# Patient Record
Sex: MALE | Race: BLACK OR AFRICAN AMERICAN | NOT HISPANIC OR LATINO | Employment: FULL TIME | URBAN - METROPOLITAN AREA
[De-identification: names, ages, dates, MRNs, and addresses within clinical notes are randomized per-mention and may not be internally consistent; named-entity substitution may affect disease eponyms.]

---

## 2021-06-10 ENCOUNTER — COMMUNICATION - HEALTHEAST (OUTPATIENT)
Dept: SCHEDULING | Facility: CLINIC | Age: 26
End: 2021-06-10

## 2021-06-25 NOTE — TELEPHONE ENCOUNTER
"Pt calls to report symptoms of withdrawal.  States \"I went to [facility] 6/8/21.\"  Reports diagnosis of \"upper resp infection.\"  States \"They gave me an inhaler.\"    Pt did not admit to \"drinking a bunch of alcohol with marijuana\" at time of ED eval.  \"They said my blood pressure was elevated and I should follow up at a clinic.\"    Had negative covid test 6/7/21.    Persisting symptoms now:  \"Feeling of confusion.\"  \"Sweating.\"  \"Diarrhea.\"  \"Vomiting.\"  \"Tremors.\"  \"Headache.\"  \"Weakness in my arms and legs.\"    \"Drinking a lot of water.\"    Pt's voice exhibits shakiness.  Advised pt to return to [facility] ED and be truthful about symptoms and substance abuse.  Discussed BP may still be elevated and pt needs medical supervision for his apparent withdrawal symptoms.  Pt agrees to plan.  Will have his brother drive him now.    Kristine Edwards RN  [facility] Triage     Reason for Disposition    Headache or vomiting    Additional Information    Negative: Difficult to awaken or acting confused (disoriented, slurred speech) and has diabetes    Negative: Difficult to awaken or acting confused (disoriented, slurred speech) and new onset    Negative: Weakness of the face, arm, or leg on one side of the body and new onset    Negative: Numbness of the face, arm, or leg on one side of the body and new onset    Negative: Loss of speech or garbled speech and new onset    Negative: Difficulty breathing and bluish (or gray) lips or face    Negative: Shock suspected (e.g., cold/pale/clammy skin, too weak to stand, low BP, rapid pulse)    Negative: Seeing or hearing or feeling things that are not there (i.e., auditory, visual, or tactile hallucinations)    Negative: Followed a head injury    Negative: Drug overdose suspected    Negative: Sounds like a life-threatening emergency to the triager    Negative: Alcohol use, abuse or dependence: question or problem related to    Negative: Drug abuse or dependence: question " "or problem related to    Protocols used: CONFUSION - DELIRIUM-A-OH    ______________________    COVID 19 Nurse Triage Plan/Patient Instructions    Please be aware that novel coronavirus (COVID-19) may be circulating in the community. If you develop symptoms such as fever, cough, or SOB or if you have concerns about the presence of another infection including coronavirus (COVID-19), please contact your health care provider or visit  [email or web address].    Disposition/Instructions    Additional COVID19 information to add for patients.   How can I protect others?  If you have symptoms (fever, cough, body aches or trouble breathing): Stay home and away from others (self-isolate) until:    At least 10 days have passed since your symptoms started, And     You ve had no fever--and no medicine that reduces fever--for 1 full day (24 hours), And      Your other symptoms have resolved (gotten better).     If you don t have symptoms, but a test showed that you have COVID-19 (you tested positive):    Stay home and away from others (self-isolate). Follow the tips under \"How do I self-isolate?\" below for 10 days (20 days if you have a weak immune system).    You don't need to be retested for COVID-19 before going back to school or work. As long as you're fever-free and feeling better, you can go back to school, work and other activities after waiting the 10 or 20 days.     How do I self-isolate?    Stay in your own room, even for meals. Use your own bathroom if you can.     Stay away from others in your home. No hugging, kissing or shaking hands. No visitors.    Don t go to work, school or anywhere else.     Clean  high touch  surfaces often (doorknobs, counters, handles, etc.). Use a household cleaning spray or wipes. You ll find a full list on the EPA website:  www.epa.gov/pesticide-registration/list-n-disinfectants-use-against-sars-cov-2.    Cover your mouth and nose with a mask, tissue or washcloth to avoid " spreading germs.    Wash your hands and face often. Use soap and water.    Caregivers in these groups are at risk for severe illness due to COVID-19:  o People 65 years and older  o People who live in a nursing home or long-term care facility  o People with chronic disease (lung, heart, cancer, diabetes, kidney, liver, immunologic)  o People who have a weakened immune system, including those who:  - Are in cancer treatment  - Take medicine that weakens the immune system, such as corticosteroids  - Had a bone marrow or organ transplant  - Have an immune deficiency  - Have poorly controlled HIV or AIDS  - Are obese (body mass index of 40 or higher)  - Smoke regularly    Caregivers should wear gloves while washing dishes, handling laundry and cleaning bedrooms and bathrooms.    Use caution when washing and drying laundry: Don t shake dirty laundry, and use the warmest water setting that you can.    For more tips, go to www.cdc.gov/coronavirus/2019-ncov/downloads/10Things.pdf.    How can I take care of myself?  1. Get lots of rest. Drink extra fluids (unless a doctor has told you not to).     2. Take Tylenol (acetaminophen) for fever or pain. If you have liver or kidney problems, ask your family doctor if it s okay to take Tylenol.     Adults can take either:     650 mg (two 325 mg pills) every 4 to 6 hours, or     1,000 mg (two 500 mg pills) every 8 hours as needed.     Note: Don t take more than 3,000 mg in one day.   Acetaminophen is found in many medicines (both prescribed and over-the-counter medicines). Read all labels to be sure you don t take too much.     For children, check the Tylenol bottle for the right dose. The dose is based on the child s age or weight.    3. If you have other health problems (like cancer, heart failure, an organ transplant or severe kidney disease): Call your specialty clinic if you don t feel better in the next 2 days.    4. Know when to call 911: Emergency warning signs  include:    Trouble breathing or shortness of breath    Pain or pressure in the chest that doesn t go away    Feeling confused like you haven t felt before, or not being able to wake up    Bluish-colored lips or face    What are the symptoms of COVID-19?     The most common symptoms are cough, fever and trouble breathing.     Less common symptoms include body aches, chills, diarrhea (loose, watery poops), fatigue (feeling very tired), headache, runny nose, sore throat and loss of smell.    COVID-19 can cause severe coughing (bronchitis) and lung infection (pneumonia).    How does it spread?     The virus may spread when a person coughs or sneezes into the air. The virus can travel about 6 feet this way, and it can live on surfaces.      Common  (household disinfectants) will kill the virus.    Who is at risk?  Anyone can catch COVID-19 if they re around someone who has the virus.    How can others protect themselves?     Stay away from people who have COVID-19 (or symptoms of COVID-19).    Wash hands often with soap and water. Or, use hand  with at least 60% alcohol.    Avoid touching the eyes, nose or mouth.     Wear a face mask when you go out in public, when sick or when caring for a sick person.    Where can I get more information?    [facility]: About COVID-19: [email or web address]    CDC: What to Do If You re Sick: www.cdc.gov/coronavirus/2019-ncov/about/steps-when-sick.html    CDC: Ending Home Isolation: www.cdc.gov/coronavirus/2019-ncov/hcp/disposition-in-home-patients.html     CDC: Caring for Someone: www.cdc.gov/coronavirus/2019-ncov/if-you-are-sick/care-for-someone.html     [facility]: Interim Guidance for Hospital Discharge to Home: www.health.[facility].mn.us/diseases/coronavirus/hcp/hospdischarge.pdf    [facility] clinical trials (COVID-19 research studies): clinicalaffairs.[facility]/n-clinical-trials     Below are the COVID-19 hotlines at the [facility]  [facility] ([facility]). Interpreters are available.   o For health questions: Call 646-319-4898 or 1-659.771.7616 (7 a.m. to 7 p.m.)  o For questions about schools and childcare: Call 545-053-7018 or 1-765.339.6499 (7 a.m. to 7 p.m.)              Thank you for taking steps to prevent the spread of this virus.  o Limit your contact with others.  o Wear a simple mask to cover your cough.  o Wash your hands well and often.    Resources    [facility]: About COVID-19: [email or web address]    CDC: What to Do If You're Sick: www.cdc.gov/coronavirus/2019-ncov/about/steps-when-sick.html    CDC: Ending Home Isolation: www.cdc.gov/coronavirus/2019-ncov/hcp/disposition-in-home-patients.html     CDC: Caring for Someone: www.cdc.gov/coronavirus/2019-ncov/if-you-are-sick/care-for-someone.html     [facility]: Interim Guidance for Hospital Discharge to Home: www.health.[facility].mn./diseases/coronavirus/hcp/hospdischarge.pdf    [facility] clinical trials (COVID-19 research studies): clinicalaffairs.[facility]/n-clinical-trials     Below are the COVID-19 hotlines at the [place] [facility] ([facility]). Interpreters are available.   o For health questions: Call 738-928-0148 or 1-355.203.9849 (7 a.m. to 7 p.m.)  o For questions about schools and childcare: Call 018-180-3831 or 1-793.662.9303 (7 a.m. to 7 p.m.)

## 2021-06-29 ENCOUNTER — COMMUNICATION - HEALTHEAST (OUTPATIENT)
Dept: SCHEDULING | Facility: CLINIC | Age: 26
End: 2021-06-29

## 2021-07-07 NOTE — TELEPHONE ENCOUNTER
"Triage note    Caller name: Hamilton  Relation to patient: self    Patient calling to report that he has chest pain that radiates down into his left arm that started yesterday and is constant. He says his BP last night was \"like 150 over 100-something.\"  Rates the pain in his chest as 7/10, sharp pain and constant. Denies any underlying health conditions/heart concerns.    Disposition: per protocol, patient to go to ED now. Patient states he's unable to get a ride right now. Discussed that he could call 911 for evaluation and possible transportation to the hospital via ambulance. He verbalized his understanding and agrees.      Ber Rosa RN  [facility] Nurse Advisor  Reason for Disposition    Pain also in shoulder(s) or arm(s) or jaw    Additional Information    Negative: Severe difficulty breathing (e.g., struggling for each breath, speaks in single words)    Negative: Difficult to awaken or acting confused (e.g., disoriented, slurred speech)    Negative: Shock suspected (e.g., cold/pale/clammy skin, too weak to stand, low BP, rapid pulse)    Negative: Passed out (i.e., fainted, collapsed and was not responding)    Negative: Chest pain lasting longer than 5 minutes and ANY of the following:* Over 45 years old* Over 30 years old and at least one cardiac risk factor (e.g., diabetes, high blood pressure, high cholesterol, smoker, or strong family history of heart disease)* History of heart disease (i.e., angina, heart attack, heart failure, bypass surgery, takes nitroglycerin)* Pain is crushing, pressure-like, or heavy    Negative: Heart beating < 50 beats per minute OR > 140 beats per minute    Negative: Visible sweat on face or sweat dripping down face    Negative: Sounds like a life-threatening emergency to the triager    Negative: Followed an injury to chest    Negative: SEVERE chest pain    Protocols used: CHEST PAIN-A-OH      "

## 2021-07-08 ENCOUNTER — ALLIED HEALTH/NURSE VISIT (OUTPATIENT)
Dept: FAMILY MEDICINE | Facility: CLINIC | Age: 26
End: 2021-07-08

## 2021-07-08 ENCOUNTER — APPOINTMENT (OUTPATIENT)
Dept: CT IMAGING | Facility: CLINIC | Age: 26
DRG: 071 | End: 2021-07-08
Attending: EMERGENCY MEDICINE
Payer: COMMERCIAL

## 2021-07-08 ENCOUNTER — HOSPITAL ENCOUNTER (INPATIENT)
Facility: CLINIC | Age: 26
LOS: 5 days | Discharge: HOME | DRG: 071 | End: 2021-07-13
Attending: EMERGENCY MEDICINE | Admitting: INTERNAL MEDICINE
Payer: COMMERCIAL

## 2021-07-08 ENCOUNTER — APPOINTMENT (OUTPATIENT)
Dept: GENERAL RADIOLOGY | Facility: CLINIC | Age: 26
DRG: 071 | End: 2021-07-08
Attending: EMERGENCY MEDICINE
Payer: COMMERCIAL

## 2021-07-08 VITALS
DIASTOLIC BLOOD PRESSURE: 94 MMHG | SYSTOLIC BLOOD PRESSURE: 136 MMHG | HEART RATE: 135 BPM | OXYGEN SATURATION: 100 % | RESPIRATION RATE: 20 BRPM

## 2021-07-08 DIAGNOSIS — M62.81 MUSCLE WEAKNESS (GENERALIZED): ICD-10-CM

## 2021-07-08 DIAGNOSIS — F29 UNSPECIFIED PSYCHOSIS NOT DUE TO A SUBSTANCE OR KNOWN PHYSIOLOGICAL CONDITION (H): Primary | ICD-10-CM

## 2021-07-08 DIAGNOSIS — R41.82 ALTERED MENTAL STATUS, UNSPECIFIED ALTERED MENTAL STATUS TYPE: ICD-10-CM

## 2021-07-08 DIAGNOSIS — R41.89 UNRESPONSIVE: Primary | ICD-10-CM

## 2021-07-08 LAB
ALBUMIN SERPL-MCNC: 4.5 G/DL (ref 3.4–5)
ALP SERPL-CCNC: 78 U/L (ref 40–150)
ALT SERPL W P-5'-P-CCNC: 44 U/L (ref 0–70)
AMMONIA PLAS-SCNC: 15 UMOL/L (ref 10–50)
ANION GAP SERPL CALCULATED.3IONS-SCNC: 10 MMOL/L (ref 3–14)
AST SERPL W P-5'-P-CCNC: 20 U/L (ref 0–45)
BASOPHILS # BLD AUTO: 0.1 10E9/L (ref 0–0.2)
BASOPHILS NFR BLD AUTO: 0.4 %
BILIRUB SERPL-MCNC: 1.9 MG/DL (ref 0.2–1.3)
BUN SERPL-MCNC: 18 MG/DL (ref 7–30)
CALCIUM SERPL-MCNC: 9.9 MG/DL (ref 8.5–10.1)
CHLORIDE SERPL-SCNC: 104 MMOL/L (ref 94–109)
CO2 SERPL-SCNC: 26 MMOL/L (ref 20–32)
CREAT SERPL-MCNC: 0.96 MG/DL (ref 0.66–1.25)
DIFFERENTIAL METHOD BLD: ABNORMAL
EOSINOPHIL # BLD AUTO: 0.1 10E9/L (ref 0–0.7)
EOSINOPHIL NFR BLD AUTO: 0.9 %
ERYTHROCYTE [DISTWIDTH] IN BLOOD BY AUTOMATED COUNT: 12.8 % (ref 10–15)
ETHANOL SERPL-MCNC: <0.01 G/DL
GFR SERPL CREATININE-BSD FRML MDRD: >90 ML/MIN/{1.73_M2}
GLUCOSE SERPL-MCNC: 104 MG/DL (ref 70–99)
HCT VFR BLD AUTO: 46.4 % (ref 40–53)
HGB BLD-MCNC: 15 G/DL (ref 13.3–17.7)
IMM GRANULOCYTES # BLD: 0 10E9/L (ref 0–0.4)
IMM GRANULOCYTES NFR BLD: 0.2 %
LIPASE SERPL-CCNC: 59 U/L (ref 73–393)
LYMPHOCYTES # BLD AUTO: 2.5 10E9/L (ref 0.8–5.3)
LYMPHOCYTES NFR BLD AUTO: 20.6 %
MCH RBC QN AUTO: 30.2 PG (ref 26.5–33)
MCHC RBC AUTO-ENTMCNC: 32.3 G/DL (ref 31.5–36.5)
MCV RBC AUTO: 93 FL (ref 78–100)
MONOCYTES # BLD AUTO: 1.2 10E9/L (ref 0–1.3)
MONOCYTES NFR BLD AUTO: 9.7 %
NEUTROPHILS # BLD AUTO: 8.4 10E9/L (ref 1.6–8.3)
NEUTROPHILS NFR BLD AUTO: 68.2 %
NRBC # BLD AUTO: 0 10*3/UL
NRBC BLD AUTO-RTO: 0 /100
PLATELET # BLD AUTO: 395 10E9/L (ref 150–450)
POTASSIUM SERPL-SCNC: 3.3 MMOL/L (ref 3.4–5.3)
PROT SERPL-MCNC: 9 G/DL (ref 6.8–8.8)
RBC # BLD AUTO: 4.97 10E12/L (ref 4.4–5.9)
SODIUM SERPL-SCNC: 140 MMOL/L (ref 133–144)
TSH SERPL DL<=0.005 MIU/L-ACNC: 2.04 MU/L (ref 0.4–4)
WBC # BLD AUTO: 12.3 10E9/L (ref 4–11)

## 2021-07-08 PROCEDURE — 93005 ELECTROCARDIOGRAM TRACING: CPT

## 2021-07-08 PROCEDURE — 82140 ASSAY OF AMMONIA: CPT | Performed by: EMERGENCY MEDICINE

## 2021-07-08 PROCEDURE — 250N000011 HC RX IP 250 OP 636: Performed by: EMERGENCY MEDICINE

## 2021-07-08 PROCEDURE — 71046 X-RAY EXAM CHEST 2 VIEWS: CPT

## 2021-07-08 PROCEDURE — 120N000001 HC R&B MED SURG/OB

## 2021-07-08 PROCEDURE — 83690 ASSAY OF LIPASE: CPT | Performed by: EMERGENCY MEDICINE

## 2021-07-08 PROCEDURE — 82077 ASSAY SPEC XCP UR&BREATH IA: CPT | Performed by: EMERGENCY MEDICINE

## 2021-07-08 PROCEDURE — 85025 COMPLETE CBC W/AUTO DIFF WBC: CPT | Performed by: EMERGENCY MEDICINE

## 2021-07-08 PROCEDURE — 74177 CT ABD & PELVIS W/CONTRAST: CPT

## 2021-07-08 PROCEDURE — 80307 DRUG TEST PRSMV CHEM ANLYZR: CPT | Performed by: EMERGENCY MEDICINE

## 2021-07-08 PROCEDURE — 86618 LYME DISEASE ANTIBODY: CPT | Performed by: EMERGENCY MEDICINE

## 2021-07-08 PROCEDURE — 99223 1ST HOSP IP/OBS HIGH 75: CPT | Mod: AI | Performed by: INTERNAL MEDICINE

## 2021-07-08 PROCEDURE — C9803 HOPD COVID-19 SPEC COLLECT: HCPCS

## 2021-07-08 PROCEDURE — 70450 CT HEAD/BRAIN W/O DYE: CPT

## 2021-07-08 PROCEDURE — 81001 URINALYSIS AUTO W/SCOPE: CPT | Performed by: EMERGENCY MEDICINE

## 2021-07-08 PROCEDURE — 84443 ASSAY THYROID STIM HORMONE: CPT | Performed by: EMERGENCY MEDICINE

## 2021-07-08 PROCEDURE — 96360 HYDRATION IV INFUSION INIT: CPT

## 2021-07-08 PROCEDURE — 36415 COLL VENOUS BLD VENIPUNCTURE: CPT | Performed by: EMERGENCY MEDICINE

## 2021-07-08 PROCEDURE — 99207 PR NO CHARGE NURSE ONLY: CPT

## 2021-07-08 PROCEDURE — 96361 HYDRATE IV INFUSION ADD-ON: CPT

## 2021-07-08 PROCEDURE — 99285 EMERGENCY DEPT VISIT HI MDM: CPT | Mod: 25

## 2021-07-08 PROCEDURE — 258N000003 HC RX IP 258 OP 636: Performed by: EMERGENCY MEDICINE

## 2021-07-08 PROCEDURE — 80053 COMPREHEN METABOLIC PANEL: CPT | Performed by: EMERGENCY MEDICINE

## 2021-07-08 RX ORDER — IOPAMIDOL 755 MG/ML
500 INJECTION, SOLUTION INTRAVASCULAR ONCE
Status: COMPLETED | OUTPATIENT
Start: 2021-07-08 | End: 2021-07-08

## 2021-07-08 RX ORDER — SODIUM CHLORIDE 9 MG/ML
INJECTION, SOLUTION INTRAVENOUS CONTINUOUS
Status: DISCONTINUED | OUTPATIENT
Start: 2021-07-08 | End: 2021-07-12

## 2021-07-08 RX ADMIN — SODIUM CHLORIDE 1000 ML: 9 INJECTION, SOLUTION INTRAVENOUS at 21:30

## 2021-07-08 RX ADMIN — IOPAMIDOL 100 ML: 755 INJECTION, SOLUTION INTRAVENOUS at 18:26

## 2021-07-08 RX ADMIN — SODIUM CHLORIDE 1000 ML: 9 INJECTION, SOLUTION INTRAVENOUS at 18:07

## 2021-07-08 ASSESSMENT — ENCOUNTER SYMPTOMS
WEAKNESS: 1
BACK PAIN: 1
APPETITE CHANGE: 1
EYE PAIN: 1
ABDOMINAL PAIN: 1
ACTIVITY CHANGE: 1
HEADACHES: 1

## 2021-07-08 NOTE — ED TRIAGE NOTES
Patient is here for generalized weakness, not talking, constipation, and head pain, fatigue and difficulty sleeping.    He was first seen at [facility] on June 29 for severe back pain and cramping.  Since then he has been constipated, not sleeping well, having abdominal pain. He has been having difficulty walking for 3 days, and trouble talking since he was in the ER on the 29th.      His brother is talking for the patient.  Patient nods and shakes his head yes/no.

## 2021-07-08 NOTE — LETTER
[facility]  [address] [place] [address]  [phone] July 16, 2021    RE:  Hamilton Mitchell                                                                                                                                                       [address] [place] [address]            To whom it may concern:    Hamilton Mitchell is under my professional care and admitted to the hospital for medical reasons from 7/8-7/13.       Sincerely,  Hilda Kilpatrick PA-C

## 2021-07-08 NOTE — PROGRESS NOTES
"Pt came in late for his appt with VINICIO MICHAEL.  Pt's brother with pt.  S-(situation): \"brain fog\" unable to walk, talk do daily ADLs.    B-(background): brother states pt had alcohol poisoning two weeks ago.  At that time he recovered with in two days.  He then developed GI issues of constipation and pain.  Pt was seen at [facility] ER and then referred to [facility].  Unable to see records in epic.  At that time pt was A/O with no cognitive impairment.  [place] and [place] no physical or cognitive impairments normally.  For the last two days pt has been declining, unable to walk w/o falling unable to talk, eat, do norm ADLs.  Pt has been using a wc from his disabled parent.      A-(assessment): /94  RR 20 SaO2 100%.  Pt sitting in WC.  Pt will not talk.  Pt follows RN with eyes.  Pt is unable to smile, open mouth or talk, but noted seems to want to talk, pt drooling.  Hand strength weak unable to raise arms.  Pt unable to move legs will push down on feet unable to push back with feet or raise legs.  Did not assess ability to stand or walk.      R-(recommendations): ER for cognitive workup.     Shameka BOOGIE RN, BSN, PAL (Patient Advocate Liaison)  [facility]   [phone]    "

## 2021-07-08 NOTE — ED PROVIDER NOTES
History     Chief Complaint:  Myriad of Issues       The history is provided by a relative and a parent.      Hamilton Mitchell is a 25 year old male who presents with myriad of issues.  He returned to the ED last week for bad cramps, but was sent home. Since then, he has had a hard time talking or walking which has been progressively getting worse. He currently has constipation and abdominal pain on the left side. He has been unable to make a full bowel movement for about a week now. He did have a small one last night but it was a episode of diarrhea. There was some blood his stool a while back. He is also having a hard time vomiting. He has not been able to eating or drinking well. He complains of his stomach being distended. He is also having a hard time sleeping and only gets a hour of sleep at night. He will usually wake up with anxiety. He appears to be confused at some moments and has total body weakness. He currently has back pain, ear pain, and a headache. The patient has been seen at Urgent care, [facility], and [facility] in the past month since his symptoms started.       Review of Systems   Constitutional: Positive for activity change and appetite change.   Eyes: Positive for pain.   Gastrointestinal: Positive for abdominal pain.   Musculoskeletal: Positive for back pain.   Neurological: Positive for weakness and headaches.     All other reviewed neg.  Difficult to assess possible mental health.    Allergies:  No Known Allergies    Medications:    Patient did not report medications.     Past Medical History:    [place]   [place]  Obesity  Asthma   Mixed HLD    Past Surgical History:    Eye Surgery     Family History:    Patient did not report family history.     Social History:  Patient was accompanied to the ED with brother and father.    Physical Exam     Patient Vitals for the past 24 hrs:   BP Temp Temp src Pulse Resp SpO2   07/08/21 2100 (!) 158/106 -- -- 117 -- --   07/08/21 2045 (!) 151/109 -- -- 121  -- 98 %   21 (!) 158/100 -- -- 122 -- 99 %   21 (!) 159/98 -- -- 117 -- --   21 (!) 147/97 -- -- 120 -- 99 %   21 (!) 156/94 -- -- 119 -- 99 %   21 1930 (!) 155/93 -- -- 113 -- 98 %   21 1915 (!) 154/95 -- -- 118 -- 98 %   21 1900 (!) 154/97 -- -- 120 -- 99 %   21 1815 (!) 140/102 -- -- 113 23 100 %   21 1800 (!) 162/100 -- -- 134 (!) 36 100 %   21 1745 (!) 154/100 -- -- 134 30 100 %   21 1700 (!) 171/110 -- -- -- -- --   21 1653 (!) 147/95 98  F (36.7  C) Temporal 139 16 100 %       Physical Exam  Constitutional: No distress.  Head: Atraumatic.  Mouth/Throat: Oropharynx is clear and moist. No oropharyngeal exudate.  Eyes: Conjunctivae and EOM are normal. No scleral icterus.  Neck: Normal range of motion. Neck supple.  No thyromegaly.  Cardiovascular: Normal rate, regular rhythm, normal heart sounds and intact distal pulses.   Pulmonary/Chest: Breath sounds normal. No respiratory distress.  Abdominal: Soft. Bowel sounds are normal. No distension. No tenderness. No rebound or guarding.   Musculoskeletal: Normal range of motion. No edema or tenderness.   Neurological: Alert and orientated to person. Eye open, flat affect. Abnormal Stare. Slow Speech.   Skin: Warm and dry. No rash noted. Not diaphoretic.     Emergency Department Course   EC  ECG taken at 1735, ECG read at 735  Sinus Tachycardia   Moderate Voltage criteria for LVH, may be normal variant  Borderline ECG   Rate 125 bpm. MS interval 122 ms. QRS duration 84 ms. QT/QTc 332/479 ms. P-R-T axes 37 -2 18.     Imaging:  XR Chest 2 Views  Final Result  IMPRESSION: Low lung volumes. Heart size and pulmonary vascularity normal. The lungs are clear.  Per Radiology     CT Abdomen Pelvis w Contrast  Final Result  IMPRESSION:   1.  There is fluid seen throughout the colon raising question of diarrhea but there is no inflammatory changes of bowel wall.  2.  Mild fatty  infiltration of liver.  Per Radiology     CT Head w/o Contrast  Final Result  IMPRESSION:  1.  No CT finding of a mass, hemorrhage or focal area suggestive of acute infarct.  Per Radiology     Laboratory:  CBC: WBC 12.3 (H), HGB 15,    Lipase: 59 (L)    Emergency Department Course:    Reviewed:  I reviewed nursing notes, vitals, past history and care everywhere    Assessments:  1716 I obtained history and examined the patient as noted above.    I rechecked the patient and explained findings.     Consults:   Hospitalist    Interventions:  1807 NA 1L IV    Disposition:  The patient was admitted to the hospital under the care of Dr. Quan.    Impression & Plan    Medical Decision Making:  Pt has a myriad of sx that do not add up.  He appears encephalopathic and possibly mental health such as schizophrenia.  We expanded his workup quite a bit tonight without any new unifying diagnosis but pt is not mentally or neurologically well at this point, has stopped walking in the last 2 days and needs admission for further workup and neuro, mental health, and GI evaluation.      Covid-19  Hamilton Mitchell was evaluated during a global COVID-19 pandemic, which necessitated consideration that the patient might be at risk for infection with the SARS-CoV-2 virus that causes COVID-19.   Applicable protocols for evaluation were followed during the patient's care.   COVID-19 was considered as part of the patient's evaluation. The plan for testing is:  a test was obtained during this visit.    Diagnosis:    ICD-10-CM    1. Muscle weakness (generalized)  M62.81    2. Altered mental status, unspecified altered mental status type  R41.82        Discharge Medications:  New Prescriptions    No medications on file         Scribe Disclosure:  I, Magen Fuller, am serving as a scribe at 5:00 PM on 7/8/2021 to document services personally performed by Damian Beltran MD based on my observations and the provider's statements to me.       Damian Beltran MD  07/08/21 8751

## 2021-07-09 ENCOUNTER — APPOINTMENT (OUTPATIENT)
Dept: MRI IMAGING | Facility: CLINIC | Age: 26
DRG: 071 | End: 2021-07-09
Attending: INTERNAL MEDICINE
Payer: COMMERCIAL

## 2021-07-09 LAB
ALBUMIN UR-MCNC: 30 MG/DL
AMPHETAMINES UR QL SCN: NEGATIVE
ANION GAP SERPL CALCULATED.3IONS-SCNC: 9 MMOL/L (ref 3–14)
APPEARANCE UR: CLEAR
B BURGDOR IGG+IGM SER QL: 0.48 (ref 0–0.89)
BARBITURATES UR QL: NEGATIVE
BENZODIAZ UR QL: NEGATIVE
BILIRUB UR QL STRIP: NEGATIVE
BUN SERPL-MCNC: 10 MG/DL (ref 7–30)
CALCIUM SERPL-MCNC: 9.3 MG/DL (ref 8.5–10.1)
CANNABINOIDS UR QL SCN: NEGATIVE
CHLORIDE SERPL-SCNC: 110 MMOL/L (ref 94–109)
CO2 SERPL-SCNC: 24 MMOL/L (ref 20–32)
COCAINE UR QL: NEGATIVE
COLOR UR AUTO: YELLOW
CREAT SERPL-MCNC: 0.68 MG/DL (ref 0.66–1.25)
CRP SERPL-MCNC: 16 MG/L (ref 0–8)
ERYTHROCYTE [DISTWIDTH] IN BLOOD BY AUTOMATED COUNT: 12.7 % (ref 10–15)
ERYTHROCYTE [SEDIMENTATION RATE] IN BLOOD BY WESTERGREN METHOD: 9 MM/H (ref 0–15)
GFR SERPL CREATININE-BSD FRML MDRD: >90 ML/MIN/{1.73_M2}
GLUCOSE BLDC GLUCOMTR-MCNC: 79 MG/DL (ref 70–99)
GLUCOSE BLDC GLUCOMTR-MCNC: 90 MG/DL (ref 70–99)
GLUCOSE BLDC GLUCOMTR-MCNC: 90 MG/DL (ref 70–99)
GLUCOSE BLDC GLUCOMTR-MCNC: 91 MG/DL (ref 70–99)
GLUCOSE SERPL-MCNC: 92 MG/DL (ref 70–99)
GLUCOSE UR STRIP-MCNC: NEGATIVE MG/DL
HCT VFR BLD AUTO: 42.4 % (ref 40–53)
HGB BLD-MCNC: 13.8 G/DL (ref 13.3–17.7)
HGB UR QL STRIP: NEGATIVE
INTERPRETATION ECG - MUSE: NORMAL
KETONES UR STRIP-MCNC: 60 MG/DL
LABORATORY COMMENT REPORT: NORMAL
LEUKOCYTE ESTERASE UR QL STRIP: NEGATIVE
MCH RBC QN AUTO: 30.4 PG (ref 26.5–33)
MCHC RBC AUTO-ENTMCNC: 32.5 G/DL (ref 31.5–36.5)
MCV RBC AUTO: 93 FL (ref 78–100)
MUCOUS THREADS #/AREA URNS LPF: PRESENT /LPF
NITRATE UR QL: NEGATIVE
OPIATES UR QL SCN: NEGATIVE
PCP UR QL SCN: NEGATIVE
PH UR STRIP: 5.5 PH (ref 5–7)
PLATELET # BLD AUTO: 334 10E9/L (ref 150–450)
POTASSIUM SERPL-SCNC: 3.5 MMOL/L (ref 3.4–5.3)
RBC # BLD AUTO: 4.54 10E12/L (ref 4.4–5.9)
RBC #/AREA URNS AUTO: 1 /HPF (ref 0–2)
SARS-COV-2 RNA RESP QL NAA+PROBE: NEGATIVE
SODIUM SERPL-SCNC: 143 MMOL/L (ref 133–144)
SOURCE: ABNORMAL
SP GR UR STRIP: 1.03 (ref 1–1.03)
SPECIMEN SOURCE: NORMAL
SQUAMOUS #/AREA URNS AUTO: <1 /HPF (ref 0–1)
UROBILINOGEN UR STRIP-MCNC: NORMAL MG/DL (ref 0–2)
VIT B12 SERPL-MCNC: 267 PG/ML (ref 193–986)
WBC # BLD AUTO: 11.1 10E9/L (ref 4–11)
WBC #/AREA URNS AUTO: 2 /HPF (ref 0–5)

## 2021-07-09 PROCEDURE — 80048 BASIC METABOLIC PNL TOTAL CA: CPT | Performed by: INTERNAL MEDICINE

## 2021-07-09 PROCEDURE — 999N001017 HC STATISTIC GLUCOSE BY METER IP

## 2021-07-09 PROCEDURE — 85027 COMPLETE CBC AUTOMATED: CPT | Performed by: INTERNAL MEDICINE

## 2021-07-09 PROCEDURE — 70553 MRI BRAIN STEM W/O & W/DYE: CPT

## 2021-07-09 PROCEDURE — 36415 COLL VENOUS BLD VENIPUNCTURE: CPT | Performed by: PSYCHIATRY & NEUROLOGY

## 2021-07-09 PROCEDURE — 258N000003 HC RX IP 258 OP 636: Performed by: INTERNAL MEDICINE

## 2021-07-09 PROCEDURE — 258N000001 HC RX 258: Performed by: INTERNAL MEDICINE

## 2021-07-09 PROCEDURE — 258N000003 HC RX IP 258 OP 636: Performed by: EMERGENCY MEDICINE

## 2021-07-09 PROCEDURE — 86140 C-REACTIVE PROTEIN: CPT | Performed by: INTERNAL MEDICINE

## 2021-07-09 PROCEDURE — 82607 VITAMIN B-12: CPT | Performed by: INTERNAL MEDICINE

## 2021-07-09 PROCEDURE — A9585 GADOBUTROL INJECTION: HCPCS | Performed by: INTERNAL MEDICINE

## 2021-07-09 PROCEDURE — 85652 RBC SED RATE AUTOMATED: CPT | Performed by: INTERNAL MEDICINE

## 2021-07-09 PROCEDURE — 36415 COLL VENOUS BLD VENIPUNCTURE: CPT | Performed by: INTERNAL MEDICINE

## 2021-07-09 PROCEDURE — 87635 SARS-COV-2 COVID-19 AMP PRB: CPT | Performed by: EMERGENCY MEDICINE

## 2021-07-09 PROCEDURE — 120N000001 HC R&B MED SURG/OB

## 2021-07-09 PROCEDURE — 86255 FLUORESCENT ANTIBODY SCREEN: CPT | Performed by: PSYCHIATRY & NEUROLOGY

## 2021-07-09 PROCEDURE — 255N000002 HC RX 255 OP 636: Performed by: INTERNAL MEDICINE

## 2021-07-09 PROCEDURE — 99233 SBSQ HOSP IP/OBS HIGH 50: CPT | Performed by: INTERNAL MEDICINE

## 2021-07-09 RX ORDER — POLYETHYLENE GLYCOL 3350 17 G/17G
1 POWDER, FOR SOLUTION ORAL DAILY
COMMUNITY
End: 2021-07-22

## 2021-07-09 RX ORDER — ACETAMINOPHEN 325 MG/1
650 TABLET ORAL EVERY 4 HOURS PRN
Status: DISCONTINUED | OUTPATIENT
Start: 2021-07-09 | End: 2021-07-13 | Stop reason: HOSPADM

## 2021-07-09 RX ORDER — NICOTINE POLACRILEX 4 MG
15-30 LOZENGE BUCCAL
Status: DISCONTINUED | OUTPATIENT
Start: 2021-07-09 | End: 2021-07-13 | Stop reason: HOSPADM

## 2021-07-09 RX ORDER — DEXTROSE MONOHYDRATE, SODIUM CHLORIDE, AND POTASSIUM CHLORIDE 50; 1.49; 9 G/1000ML; G/1000ML; G/1000ML
INJECTION, SOLUTION INTRAVENOUS CONTINUOUS
Status: DISCONTINUED | OUTPATIENT
Start: 2021-07-09 | End: 2021-07-12

## 2021-07-09 RX ORDER — DEXTROSE MONOHYDRATE 25 G/50ML
25-50 INJECTION, SOLUTION INTRAVENOUS
Status: DISCONTINUED | OUTPATIENT
Start: 2021-07-09 | End: 2021-07-13 | Stop reason: HOSPADM

## 2021-07-09 RX ORDER — AMOXICILLIN 250 MG
2 CAPSULE ORAL 2 TIMES DAILY
Status: DISCONTINUED | OUTPATIENT
Start: 2021-07-09 | End: 2021-07-13 | Stop reason: HOSPADM

## 2021-07-09 RX ORDER — LIDOCAINE 40 MG/G
CREAM TOPICAL
Status: DISCONTINUED | OUTPATIENT
Start: 2021-07-09 | End: 2021-07-13 | Stop reason: HOSPADM

## 2021-07-09 RX ORDER — ONDANSETRON 2 MG/ML
4 INJECTION INTRAMUSCULAR; INTRAVENOUS EVERY 6 HOURS PRN
Status: DISCONTINUED | OUTPATIENT
Start: 2021-07-09 | End: 2021-07-13 | Stop reason: HOSPADM

## 2021-07-09 RX ORDER — AMOXICILLIN 250 MG
1 CAPSULE ORAL 2 TIMES DAILY
Status: DISCONTINUED | OUTPATIENT
Start: 2021-07-09 | End: 2021-07-13 | Stop reason: HOSPADM

## 2021-07-09 RX ORDER — GADOBUTROL 604.72 MG/ML
10 INJECTION INTRAVENOUS ONCE
Status: COMPLETED | OUTPATIENT
Start: 2021-07-09 | End: 2021-07-09

## 2021-07-09 RX ORDER — POLYETHYLENE GLYCOL 3350 17 G/17G
17 POWDER, FOR SOLUTION ORAL DAILY PRN
Status: DISCONTINUED | OUTPATIENT
Start: 2021-07-09 | End: 2021-07-13 | Stop reason: HOSPADM

## 2021-07-09 RX ORDER — ONDANSETRON 4 MG/1
4 TABLET, ORALLY DISINTEGRATING ORAL EVERY 6 HOURS PRN
Status: DISCONTINUED | OUTPATIENT
Start: 2021-07-09 | End: 2021-07-13 | Stop reason: HOSPADM

## 2021-07-09 RX ORDER — SODIUM CHLORIDE 9 MG/ML
INJECTION, SOLUTION INTRAVENOUS CONTINUOUS
Status: DISCONTINUED | OUTPATIENT
Start: 2021-07-09 | End: 2021-07-09

## 2021-07-09 RX ADMIN — GADOBUTROL 10 ML: 604.72 INJECTION INTRAVENOUS at 11:00

## 2021-07-09 RX ADMIN — SODIUM CHLORIDE: 9 INJECTION, SOLUTION INTRAVENOUS at 01:58

## 2021-07-09 RX ADMIN — SODIUM CHLORIDE: 9 INJECTION, SOLUTION INTRAVENOUS at 01:59

## 2021-07-09 RX ADMIN — SODIUM CHLORIDE: 9 INJECTION, SOLUTION INTRAVENOUS at 11:51

## 2021-07-09 RX ADMIN — POTASSIUM CHLORIDE, DEXTROSE MONOHYDRATE AND SODIUM CHLORIDE: 150; 5; 900 INJECTION, SOLUTION INTRAVENOUS at 17:35

## 2021-07-09 ASSESSMENT — ACTIVITIES OF DAILY LIVING (ADL)
ADLS_ACUITY_SCORE: 20
ADLS_ACUITY_SCORE: 22
ADLS_ACUITY_SCORE: 20

## 2021-07-09 NOTE — H&P
Admitted: 07/08/2021    CHIEF COMPLAINT:  Generalized weakness, minimal communication, fatigue, difficulty sleeping.    HISTORY OF PRESENT ILLNESS:  Mr. Mitchell is a 25-year-old male who presents to the hospitalist for the above concerns.  This is the patient's fourth Emergency Department visit for these complaints.  I am obtaining history primarily from the patient's father, who is at bedside, as the patient is minimally communicative with me.  The patient's father reports that the symptoms seemed to start about 4 weeks ago after the patient was drinking alcohol with friends.  Since that time, the patient has had progressive problems develop over the past 1 month.  He has had intermittent abdominal pain and back pain.  He has been sleeping poorly.  He has had issues with constipation.  He has been unable to work at his job for the last 2 weeks given his ongoing symptoms.  Over the past 3-4 days, symptoms have gotten worse to the point where he has not gotten out of bed for the past several days and has hardly been eating or drinking anything.    The patient was evaluated at several different Emergency Departments over the past several weeks.  There has been no clear etiology for his symptoms identified    The patient's father relates that the patient has never had symptoms like this before.  He has no mental health history.  There was no injury or trauma.    When I try to ask the patient questions, he will turn his head towards me and stare at me.  I have to ask questions several times to get a response from him.  He can hear me but has very delayed responses.  He will answer questions without moving his mouth or lips, or he will nod his head.    Previous workup at outside Emergency Department included abdominal pelvic CT scan from 07/03/2021, showing no acute findings.  His labs have been notable for leukocytosis with white cell count in the 10,000 to 15,000 range over the past week, both in the [facility] and the  [facility] ER.  Other labs fairly unremarkable.  He does appear to have a persistently elevated bilirubin in the 1.2-1.5 range on labs.  Recent COVID-19 testing 06/29/2021 was negative.    I spoke with Dr. Beltran of the ER.  Plan is for admission to the hospital for further evaluation of his symptoms.    PAST MEDICAL HISTORY:  Patient and father deny any significant medical history.  No mental health history, including depression or anxiety.  No known history of drug use.    CURRENT MEDICATIONS:  Patient was taking a number of over-the-counter medications/vitamins, which he stopped because of a concern that perhaps these were causing his symptoms.  Does not appear to be any new prescription medicines.    ALLERGIES:  NONE.    FAMILY HISTORY:  Reviewed.  Nothing contributory to this admission.    SOCIAL HISTORY:  The patient last used alcohol 4 weeks ago.  As mentioned above, no known illicit drug use.  No smoking.    REVIEW OF SYSTEMS:  Challenging in this patient, as he is minimally responsive to my questions.  As best as able, a comprehensive greater than 10-point review of systems is negative besides that detailed above.    PHYSICAL EXAMINATION:    VITAL SIGNS:  Blood pressure is currently 150/90 with a heart rate of 115.  No fever.  Saturation 95% on room air.  GENERAL:  The patient does not appear to be in any acute distress.  His father was present at bedside and answering most questions for the patient.  When asking the patient questions, he will turn his head towards me and stare blankly.  Initially he was not answering me at all, but when I asked him if he could hear me, he did respond yes.  He will answer questions by not moving his lips or mouth, almost having the appearance of a ventriloquist.  He will nod his head minimally to answer questions as well.  He does follow commands, although slowly.  HEENT:  Head is atraumatic.  Sclerae white.  Eyelids normal.  Conjunctivae are normal.  Extraocular  movements are intact.  NECK:  Supple.  No cervical or supraclavicular lymphadenopathy.  CARDIOVASCULAR:  Heart is tachycardic.  Rhythm is regular.  No significant murmurs.  No lower extremity edema.  LUNGS:  Clear to auscultation bilaterally.  No intercostal retractions.  No conversational dyspnea.  ABDOMEN:  Nontender, soft, no masses.  No organomegaly.  EXTREMITIES:  Show no edema.  SKIN:  Reveals no rashes.  No jaundice.  Skin is dry to touch.  NEUROLOGIC:  As best as able, II-XII appear to be intact.  He does follow commands.  He will  my fingers, and  strength appears to be normal.  He will also dorsiflex and plantar flex his feet.  He is able to lift his legs off of the gurney against resistance.  He did not respond significantly when I was pinching his feet.  Somewhat unclear if he was able to feel this or simply was just not responding.    PSYCHIATRIC:  Awake and alert.  As mentioned above, responds very slowly to questions and has to be asked several times, and even with that, may not respond to some questions.  Father present at bedside, answering questions for the patient when he does not respond.    LABORATORY AND IMAGING DATA:  Reviewed above in HPI.    IMPRESSION AND PLAN:  Mr. Mitchell is a 25-year-old male who denies any significant medical history who presents to the hospital for 4 weeks of progressive decline which has included intermittent abdominal and back pain, poor sleep, generalized weakness and deconditioning, fatigue, decreased oral intake.  Given worsening symptoms, he was brought to the ER for evaluation.    The patient was seen in several Emergency Departments recently.  On my chart review, his workup has included labs and imaging.  Abdominal pelvic CT scan was performed about 1 week ago with no significant findings.  Lab work is only with notable for mild leukocytosis and mild elevation of bilirubin.  No clear cause for symptoms has been identified.    1.  Encephalopathy, cause  unclear.  Consider both possible medical versus psychiatric causes.  2.  Leukocytosis, mild.  No clear infection source, and has had an elevated white blood cell count on previous ER visits.  3.  Mild bilirubin elevation, again consistent with previous labs and likely unrelated to this presentation.  4.  Mild hypokalemia.  5.  Dehydration with poor oral intake.  6.  Generalized weakness and deconditioning.    PLAN:    1.  We will admit to Inpatient service for further workup and evaluation of his symptoms.  2.  Check ammonia level.  3.  Check TSH.  4.  Check B12.  5.  Check procalcitonin level.  6.  We will obtain brain MRI.  7.  We will obtain Neurology consultation to help evaluate for possible medical causes of his symptoms.  8.  Consider lumbar puncture if Neurology feels that this might be useful to obtain a diagnosis in this patient.  9.  Check ESR.  10.  Check CRP level.  11.  If all of above unremarkable, consider Psychiatry consultation, as mental health diagnosis a possibility here.  12.  We will obtain Physical Therapy consultation as well.    Senthil Vides MD        D: 2021   T: 2021   MT: KLARISSA    Name:     NICKY ALMAGUER  MRN:      [id number]        Account:     [id number]   :      1995           Admitted:    2021       Document: [id number]

## 2021-07-09 NOTE — PLAN OF CARE
PT: Orders received, met briefly with pt this AM. Pt remains encephalopathic, and is limited in his ability to participate in meaningful PT session at this time. Will allow for further medical workup/treatment of encephalopathy before initiating IP PT.

## 2021-07-09 NOTE — PHARMACY-ADMISSION MEDICATION HISTORY
Admission medication history interview status for this patient is complete. See [facility] admission navigator for allergy information, prior to admission medications and immunization status.     Medication history interview done, indicate source(s): Patient's father Malick  Medication history resources (including written lists, pill bottles, clinic record): care everywhere  Pharmacy: [facility] in [place]    Changes made to PTA medication list:  Added: miralax  Deleted: nothing  Changed: nothing    Actions taken by pharmacist (provider contacted, etc): Spoke with patient's father    Additional medication history information: Patient's father reported that he has had trouble swallowing pills, so he stopped taking all prn medications over a week ago. The patient notes that the patient took miralax 3 days ago. The patient's father reiterated that the patient is unable to take tabs/caps PO at this time, and medications through other routes is ideal.     Medication reconciliation/reorder completed by provider prior to medication history?  Y   (Y/N)     Prior to Admission medications    Medication Sig Last Dose Taking? Auth Provider   polyethylene glycol (MIRALAX) 17 GM/Dose powder Take 1 capful by mouth daily 7/6/2021 Yes Unknown, Entered By History

## 2021-07-09 NOTE — PROGRESS NOTES
[facility]  Hospitalist Progress Note  Lee Pineda MD 07/09/2021    Reason for Stay (Diagnosis): Generalized weakness, fatigue, difficulty sleeping, very minimal communication.         Assessment and Plan:      Summary of Stay: Hamilton Mitchell is a 25 year old male, without significant past medical history who presents to the emergency room with progressive decline including generalized weakness, fatigue, difficulty sleeping, decreased oral intake, family reported minimal communication for the last 4 weeks and admitted to the hospital .     Patient does not give any history, his father at bedside who stated he was told different emergency room 4 times, all the time she was told he had no medical conditions and discharged back home.  His father also stated that the symptoms might have started after he had alcohol drink about 4 weeks ago, though he is not sure if is related.  All the work-up done including labs and imaging in the recent past were without any abnormalities, his lab work was notable for mild mild leukocytosis, and elevation of bilirubin.  No clear cause for symptoms has been identified.      1.    Subacute encephalopathy: It started about 4 weeks ago and has been worsening.  There was no seizure signs, no tremors, no loss of control of his urine or bowel.  Patient is almost mute does not give any history.  I suspect more of psychiatric cause is neurologic or medical issues.  Toxicology studies done so far negative.  Neurology is consulted, also psychiatrist was consulted.  -Patient is not opening his mouth, unable to drink, currently is on IV fluids.  He is in semisitting position, opening his eyes, not in any form of distress.   -His father stated when he wakes up from sleep he is anxious and holds his father and and tells him he lives with him and asks not to leave him alone.  -He also see his team's notes were not there, this is more likely psychiatric condition.  At this  "point as patient is not talking unable to give any detailed history and to sort out possible psychiatric condition.    2.  Leukocytosis, mild.  No clear infection source, and has had an elevated white blood cell count on previous ER visits.  -Is likely stress related.  -Continue to monitor  3.  Mild bilirubin elevation: Is consistent with previous studies  -There is no other findings.  -Could be Gilbert's.  4.  Mild hypokalemia, due to decreased oral intake.  Potassium will be replaced.    5.  Dehydration with poor oral intake.  6.  Generalized weakness and deconditioning      DVT Prophylaxis: Pneumatic Compression Devices and Ambulate every shift  Code Status: Full Code  Discharge Dispo: Home  Estimated Disch Date / # of Days until Disch: More than 2 nights pending improvement.  I discussed with his father at bedside all his questions answered.  Patient did not give any history, did not ask any question he is just mute.      Interval History (Subjective):      Patient seen and examined, assumed care today, H&P reviewed, his father at bedside, patient is fully awake, does not give any history.  Calm and resting comfortably in bed.                  Physical Exam:      Last Vital Signs:  BP (!) 148/82 (BP Location: Left arm)   Pulse 131   Temp 98.3  F (36.8  C)   Resp 14   Ht 1.727 m (5' 8\")   Wt 101.7 kg (224 lb 1.6 oz)   SpO2 99%   BMI 34.07 kg/m      I/O last 3 completed shifts:  In: 560 [I.V.:560]  Out: -   Vitals:    07/09/21 0904   Weight: 101.7 kg (224 lb 1.6 oz)     Current Facility-Administered Medications   Medication     acetaminophen (TYLENOL) tablet 650 mg     lidocaine (LMX4) cream     lidocaine 1 % 0.1-1 mL     melatonin tablet 1 mg     ondansetron (ZOFRAN-ODT) ODT tab 4 mg    Or     ondansetron (ZOFRAN) injection 4 mg     polyethylene glycol (MIRALAX) Packet 17 g     senna-docusate (SENOKOT-S/PERICOLACE) 8.6-50 MG per tablet 1 tablet    Or     senna-docusate (SENOKOT-S/PERICOLACE) 8.6-50 MG per " tablet 2 tablet     sodium chloride (PF) 0.9% PF flush 3 mL     sodium chloride (PF) 0.9% PF flush 3 mL     sodium chloride 0.9% infusion     sodium chloride 0.9% infusion       Constitutional: Awake, keeps eye contact, does respond to questions her body but by moving his eyes or his head.  Calm, no agitation   Respiratory: Clear to auscultation bilaterally, no crackles or wheezing   Cardiovascular: Regular rate and rhythm, normal S1 and S2, and no murmur noted   Abdomen: Normal bowel sounds, soft, non-distended, non-tender   Skin: No rashes, no cyanosis, dry to touch   Neuro: Alert and oriented x3, no weakness, numbness, memory loss   Extremities: No edema, normal range of motion   Other(s):HEENT Pink, nonicteric, moist oral mucosa       All other systems: Negative          Medications:      All current medications were reviewed with changes reflected in problem list.         Data:      All new lab and imaging data was reviewed.   Labs:  Recent Labs   Lab 07/09/21 0852 07/08/21  1746   WBC 11.1* 12.3*   HGB 13.8 15.0   HCT 42.4 46.4   MCV 93 93    395     Recent Labs   Lab 07/09/21  0852 07/08/21  1746    140   POTASSIUM 3.5 3.3*   CHLORIDE 110* 104   CO2 24 26   ANIONGAP 9 10   GLC 92 104*   BUN 10 18   CR 0.68 0.96   GFRESTIMATED >90 >90   GFRESTBLACK >90 >90   KUNAL 9.3 9.9   PROTTOTAL  --  9.0*   ALBUMIN  --  4.5   BILITOTAL  --  1.9*   ALKPHOS  --  78   AST  --  20   ALT  --  44     Recent Labs   Lab 07/09/21  0852 07/08/21  1746    140   POTASSIUM 3.5 3.3*   CHLORIDE 110* 104   CO2 24 26   GLC 92 104*     Recent Labs   Lab 07/09/21  0920 07/09/21  0852 07/08/21  1746   GLC  --  92 104*   BGM 90  --   --       Imaging:   Results for orders placed or performed during the hospital encounter of 07/08/21   CT Head w/o Contrast    Narrative    EXAM: CT HEAD W/O CONTRAST  LOCATION: [facility]  DATE/TIME: 7/8/2021 6:25 PM    INDICATION: Mental status change, unknown cause  COMPARISON:  None.  TECHNIQUE: Routine CT Head without IV contrast. Multiplanar reformats. Dose reduction techniques were used.    FINDINGS:  INTRACRANIAL CONTENTS: No intracranial hemorrhage, extraaxial collection, or mass effect.  No CT evidence of acute infarct. Normal parenchymal attenuation. Normal ventricles and sulci.     VISUALIZED ORBITS/SINUSES/MASTOIDS: No intraorbital abnormality. No paranasal sinus mucosal disease. No middle ear or mastoid effusion.    BONES/SOFT TISSUES: No acute abnormality.      Impression    IMPRESSION:  1.  No CT finding of a mass, hemorrhage or focal area suggestive of acute infarct.   XR Chest 2 Views    Narrative    EXAM: XR CHEST 2 VW  LOCATION: [facility]  DATE/TIME: 7/8/2021 6:48 PM    INDICATION: Weakness  COMPARISON: None.      Impression    IMPRESSION: Low lung volumes. Heart size and pulmonary vascularity normal. The lungs are clear.   CT Abdomen Pelvis w Contrast    Narrative    EXAM: CT ABDOMEN PELVIS W CONTRAST  LOCATION: [facility]  DATE/TIME: 7/8/2021 6:24 PM    INDICATION: Abdominal  COMPARISON: None.  TECHNIQUE: CT scan of the abdomen and pelvis was performed following injection of IV contrast. Multiplanar reformats were obtained. Dose reduction techniques were used.  CONTRAST: 100 mL Isovue-370    FINDINGS:   LOWER CHEST: Normal.    HEPATOBILIARY: Diffuse fatty infiltration of liver. Mild motion artifact.    PANCREAS: Normal.    SPLEEN: Normal.    ADRENAL GLANDS: Normal.    KIDNEYS/BLADDER: Mild motion artifact. No stones or hydronephrosis.    BOWEL: No obstruction or inflammatory change. Appendix normal. Mild motion artifact. Fluid seen throughout the colon suggesting diarrhea but no inflammatory wall thickening.    LYMPH NODES: Normal.    VASCULATURE: Unremarkable.    PELVIC ORGANS: Normal.    MUSCULOSKELETAL: Normal.      Impression    IMPRESSION:   1.  There is fluid seen throughout the colon raising question of diarrhea but there is no  inflammatory changes of bowel wall.  2.  Mild fatty infiltration of liver.   MR Brain w/o & w Contrast    Narrative    MRI BRAIN WITHOUT AND WITH CONTRAST  7/9/2021 10:58 AM     HISTORY:  Mental status change, unknown cause.     TECHNIQUE:  Multiplanar, multisequence MRI of the brain without and  with 10 mL Gadavist.     COMPARISON: CT head 7/8/2021.     FINDINGS: There is moderate motion-related artifact on several  sequences, which somewhat limits the exam. There is no definite  abnormal intracranial restricted diffusion to suggest acute infarct.  The ventricles are normal in size and configuration. No definite  intracranial hemorrhage, extra-axial fluid collection, or definite  mass lesion. There is no significant mass effect or shift/herniation.  No gross abnormal postcontrast enhancement is identified.    No gross abnormality of the visualized skull base, mid face or  calvarium.      Impression    IMPRESSION: Somewhat motion-limited exam without definite findings of  acute intracranial process.    BOLIVAR YAN MD         SYSTEM ID:  [id number]

## 2021-07-09 NOTE — PLAN OF CARE
Pt is A&Ox3 intermittently, unable to find words the majority of the time. VSS, . Pain in abdomen and chest. Provider notified. MRI done. Weakness in bilateral lower extremities, unable to stick out tongue or swallow, unable to raise right eye brow. NS running at 100 mL/hr. Not eating or drinking d/t inability to swallow. Bladder scanned for 436 mL. Unable to void in urinal. No BM. BS active. Shifting weight in bed d/t weakness in bilateral LE. Psych and neurology consulted. Will continue to monitor.

## 2021-07-09 NOTE — PROGRESS NOTES
Pt arrived on the floor at around 01am accompaned by his father.nonverbal,stares at staff,tachycardic.Pt has not got up yet. Pt did not sleep all night long.Father in the room answering most of the questions.Pt was unable to void.BS- 835ml.Straight cath with 950ml out.IVF infusing at 100ml/hr.Will continue to monitor.

## 2021-07-09 NOTE — CONSULTS
Neurology Consult Note  [facility]       [2021]                                                                                       Admission Date: 2021  Hospital Day: 2      Patient: Hamilton Mitchell      : 1995  MRN:  [id number]     CC:      Chief Complaint   Patient presents with     Multiple Symptoms       Consult Request:  Referring Provider:  Senthil Vides MD  Primary Care Provider:  Kareem, [place] MD        HPI:  Hamilton Mitchell is a 25 year old yo male admitted for decreased responsiveness for the last 2 weeks. He started noticing abdominal pain and back pain 4 weeks ago with no change in responsiveness. He was evaluated in multiple ERs with no significant abnormality. He had difficulty sleeping in the last 3-4 weeks. His father denies any h/o mental disorders in the past. No h/o seizures.  His father notices that he stopped communicating in the last week.  MRI brain and head CT- unremarkable.  Patient has elevated CRP.with normal ESR.          A complete review of symptoms was performed including vascular, infectious, cardiovascular, pulmonary, gastrointestinal, endocrinological, hematologic, dermatologic, musculoskeletal, and neurological. All were normal except as above.    PAST MEDICAL HISTORY:  ALLERGIES: No Known Allergies  Tobacco:    History   Smoking Status     Not on file   Smokeless Tobacco     Not on file     Alcohol:  Social History    Substance and Sexual Activity      Alcohol use: Not on file    MEDICATIONS:       CURRENTLY SCHEDULED MEDICATIONS     senna-docusate  1 tablet Oral BID    Or     senna-docusate  2 tablet Oral BID     sodium chloride (PF)  3 mL Intracatheter Q8H          HOME MEDICATIONS  Medications Prior to Admission   Medication Sig Dispense Refill Last Dose     polyethylene glycol (MIRALAX) 17 GM/Dose powder Take 1 capful by mouth daily   2021     MEDICAL HISTORY  No past medical history on  "file.  SURGICAL HISTORY  No past surgical history on file.  FAMILY HISTORY    No family history on file.  SOCIAL HISTORY  Social History     Socioeconomic History     Marital status: Single     Spouse name: Not on file     Number of children: Not on file     Years of education: Not on file     Highest education level: Not on file   Occupational History     Not on file   Social Needs     Financial resource strain: Not on file     Food insecurity     Worry: Not on file     Inability: Not on file     Transportation needs     Medical: Not on file     Non-medical: Not on file   Tobacco Use     Smoking status: Not on file   Substance and Sexual Activity     Alcohol use: Not on file     Drug use: Not on file     Sexual activity: Not on file   Lifestyle     Physical activity     Days per week: Not on file     Minutes per session: Not on file     Stress: Not on file   Relationships     Social connections     Talks on phone: Not on file     Gets together: Not on file     Attends [employer] service: Not on file     Active member of club or organization: Not on file     Attends meetings of clubs or organizations: Not on file     Relationship status: Not on file     Intimate partner violence     Fear of current or ex partner: Not on file     Emotionally abused: Not on file     Physically abused: Not on file     Forced sexual activity: Not on file   Other Topics Concern     Not on file   Social History Narrative     Not on file            Height: 172.7 cm (5' 8\")     Temp: 98.3  F (36.8  C)   Weight: 101.7 kg (224 lb 1.6 oz)    Temp src: Temporal         BP: (!) 148/82         Estimated body mass index is 34.07 kg/m  as calculated from the following:    Height as of this encounter: 1.727 m (5' 8\").    Weight as of this encounter: 101.7 kg (224 lb 1.6 oz).    Resp: 14   SpO2: 99 %   O2 Device: None (Room air)     Blood Pressure:   BP Readings from Last 3 Encounters:   07/09/21 (!) 148/82   07/08/21 (!) 136/94     T24 : Temp " (24hrs), Av.7  F (36.5  C), Min:97.2  F (36.2  C), Max:98.3  F (36.8  C)       GENERAL EXAMINATION:  HEENT: PERRLA, EOMI.  Neck: Supple..   Chest: Bilateral air entry present.  Heart: S1, S2 RRR.      NEUROLOGICAL EXAMINATION  Mental Status:  Patient is awake, alert but non communicative. He brings out occasional appropriate words. He could repeat Friday and repeat a few words in Kiswahili. He can not recognize his father.      Cranial Nerves: Pupils are equal and reactive to light. Extraocular movements are intact. There is no nystagmus in the horizontal or vertical planes. No facial weakness.      Motor: Muscle tone is normal. Patient does not move his legs to pain.He moves his hands. Deep tendon reflexes are brisk bilaterally.. Plantars equivocal.     Sensory: no response to pain..     Coordination: could not evaluate.    Gait: could not evaluate    .  LABORATORY RESULTS      Recent Labs   Lab 21  1746   WBC 11.1* 12.3*   HGB 13.8 15.0   HCT 42.4 46.4   MCV 93 93    395     Recent Labs   Lab 21  0852 21  1746    140   POTASSIUM 3.5 3.3*   CHLORIDE 110* 104   CO2 24 26   ANIONGAP 9 10   GLC 92 104*   BUN 10 18   CR 0.68 0.96   GFRESTIMATED >90 >90   GFRESTBLACK >90 >90   KUNAL 9.3 9.9   PROTTOTAL  --  9.0*   ALBUMIN  --  4.5   BILITOTAL  --  1.9*   ALKPHOS  --  78   AST  --  20   ALT  --  44     Recent Labs   Lab 21  1746   TSH 2.04     Recent Labs   Lab 21  0710   COLOR Yellow   APPEARANCE Clear   URINEGLC Negative   URINEBILI Negative   URINEKETONE 60*   SG 1.030   UBLD Negative   URINEPH 5.5   PROTEIN 30*   NITRITE Negative   LEUKEST Negative   RBCU 1   WBCU 2       IMAGING RESULTS     Recent Results (from the past 24 hour(s))   CT Head w/o Contrast    Narrative    EXAM: CT HEAD W/O CONTRAST  LOCATION: [facility]  DATE/TIME: 2021 6:25 PM    INDICATION: Mental status change, unknown cause  COMPARISON: None.  TECHNIQUE: Routine CT Head  without IV contrast. Multiplanar reformats. Dose reduction techniques were used.    FINDINGS:  INTRACRANIAL CONTENTS: No intracranial hemorrhage, extraaxial collection, or mass effect.  No CT evidence of acute infarct. Normal parenchymal attenuation. Normal ventricles and sulci.     VISUALIZED ORBITS/SINUSES/MASTOIDS: No intraorbital abnormality. No paranasal sinus mucosal disease. No middle ear or mastoid effusion.    BONES/SOFT TISSUES: No acute abnormality.      Impression    IMPRESSION:  1.  No CT finding of a mass, hemorrhage or focal area suggestive of acute infarct.   CT Abdomen Pelvis w Contrast    Narrative    EXAM: CT ABDOMEN PELVIS W CONTRAST  LOCATION: [facility]  DATE/TIME: 7/8/2021 6:24 PM    INDICATION: Abdominal  COMPARISON: None.  TECHNIQUE: CT scan of the abdomen and pelvis was performed following injection of IV contrast. Multiplanar reformats were obtained. Dose reduction techniques were used.  CONTRAST: 100 mL Isovue-370    FINDINGS:   LOWER CHEST: Normal.    HEPATOBILIARY: Diffuse fatty infiltration of liver. Mild motion artifact.    PANCREAS: Normal.    SPLEEN: Normal.    ADRENAL GLANDS: Normal.    KIDNEYS/BLADDER: Mild motion artifact. No stones or hydronephrosis.    BOWEL: No obstruction or inflammatory change. Appendix normal. Mild motion artifact. Fluid seen throughout the colon suggesting diarrhea but no inflammatory wall thickening.    LYMPH NODES: Normal.    VASCULATURE: Unremarkable.    PELVIC ORGANS: Normal.    MUSCULOSKELETAL: Normal.      Impression    IMPRESSION:   1.  There is fluid seen throughout the colon raising question of diarrhea but there is no inflammatory changes of bowel wall.  2.  Mild fatty infiltration of liver.   XR Chest 2 Views    Narrative    EXAM: XR CHEST 2 VW  LOCATION: [facility]  DATE/TIME: 7/8/2021 6:48 PM    INDICATION: Weakness  COMPARISON: None.      Impression    IMPRESSION: Low lung volumes. Heart size and pulmonary  vascularity normal. The lungs are clear.   MR Brain w/o & w Contrast    Narrative    MRI BRAIN WITHOUT AND WITH CONTRAST  7/9/2021 10:58 AM     HISTORY:  Mental status change, unknown cause.     TECHNIQUE:  Multiplanar, multisequence MRI of the brain without and  with 10 mL Gadavist.     COMPARISON: CT head 7/8/2021.     FINDINGS: There is moderate motion-related artifact on several  sequences, which somewhat limits the exam. There is no definite  abnormal intracranial restricted diffusion to suggest acute infarct.  The ventricles are normal in size and configuration. No definite  intracranial hemorrhage, extra-axial fluid collection, or definite  mass lesion. There is no significant mass effect or shift/herniation.  No gross abnormal postcontrast enhancement is identified.    No gross abnormality of the visualized skull base, mid face or  calvarium.      Impression    IMPRESSION: Somewhat motion-limited exam without definite findings of  acute intracranial process.    BOLIVAR YAN MD         SYSTEM ID:  [id number]       _____________________________________________________________________________    _____________________________________________________________________________          ASSESSMENT     1. Patient with neuropsychiatric syndrome of decreased speech, insomnia, slow processing- DDX- paraneoplastic encephalitis, infectious encephalitis vs subclinical status        RECOMMENDATIONS   1. Spinal tap- check CSF for HSV, [place] virus  2. EEG  3. Paraneoplastic antibody panel, IgLON5 antibody

## 2021-07-09 NOTE — PROVIDER NOTIFICATION
Pt . /82. Diaphoretic. Afebrile. Complains of chest pain and abdominal pain in all quadrants upon palpation. States difficulty breathing. Shallow breathing. 99% on RA. Neuro exam exceptions, unable to raise eye brows equally, right eye brow unresponsive. Left arm drooping. Concerned about pt status.

## 2021-07-09 NOTE — UTILIZATION REVIEW
"  Admission Status; Secondary Review Determination         Under the authority of the Utilization Management Committee, the utilization review process indicated a secondary review on the above patient.  The review outcome is based on review of the medical records, discussions with staff, and applying clinical experience noted on the date of the review.        (X)      Inpatient Status Appropriate - This patient's medical care is consistent with medical management for inpatient care and reasonable inpatient medical practice.      () Observation Status Appropriate - This patient does not meet hospital inpatient criteria and is placed in observation status. If this patient's primary payer is Medicare and was admitted as an inpatient, Condition Code 44 should be used and patient status changed to \"observation\".   () Admission Status NOT Appropriate - This patient's medical care is not consistent with medical management for Inpatient or Observation Status.          RATIONALE FOR DETERMINATION     26 yo male who was admitted with progressively worsening generalized weakness, minimal communication, fatigue, difficulty sleeping, and abdominal pain.  Patient has an encephalopathy with an elevated WBC.  Extensive work up is planned with neurology evaluation.  He has intermiitant tachycardia.  Inpatient status is appropriate.    The severity of illness, intensity of service provided, expected LOS and risk for adverse outcome make the care complex, high risk and appropriate for hospital admission.        The information on this document is developed by the utilization review team in order for the business office to ensure compliance.  This only denotes the appropriateness of proper admission status and does not reflect the quality of care rendered.         The definitions of Inpatient Status and Observation Status used in making the determination above are those provided in the CMS Coverage Manual, Chapter 1 and Chapter 6, " section 70.4.      Sincerely,     Jonas Estrada MD  Physician Advisor  Utilization Review/ Case Management  [facility].

## 2021-07-09 NOTE — ED NOTES
[facility]  ED Nurse Handoff Report    Hamilton Mitchell is a 25 year old male   ED Chief complaint: Multiple Symptoms  . ED Diagnosis:   Final diagnoses:   Muscle weakness (generalized)   Altered mental status, unspecified altered mental status type     Allergies: No Known Allergies    Code Status: Full Code  Activity level - Baseline/Home:  Independent. Activity Level - Current:   Assist X 1. Lift room needed: No. Bariatric: No   Needed: No   Isolation: No. Infection: Not Applicable.     Vital Signs:   Vitals:    07/08/21 2100 07/08/21 2200 07/08/21 2215 07/08/21 2230   BP: (!) 158/106 (!) 159/83 (!) 145/94 (!) 147/90   Pulse: 117 128 98 114   Resp:       Temp:       TempSrc:       SpO2:  100% 97% 95%       Cardiac Rhythm:  ,      Pain level:    Patient confused: No. Patient Falls Risk: Yes.   Elimination Status: Has voided   Patient Report - Initial Complaint: Patient is here for generalized weakness, not talking, constipation, and head pain, fatigue and difficulty sleeping.     He was first seen at [facility] on June 29 for severe back pain and cramping.  Since then he has been constipated, not sleeping well, having abdominal pain. He has been having difficulty walking for 3 days, and trouble talking since he was in the ER on the 29th.       His brother is talking for the patient.  Patient nods and shakes his head yes/no.. Focused Assessment:    17:30 Gastrointestinal Gastrointestinal - Gastrointestinal WDL: .WDL except; GI symptoms  GI Signs/Symptoms: abdominal discomfort; constipation       17:30 Musculoskeletal Musculoskeletal - Musculoskeletal WDL: .WDL except  General Mobility: generalized weakness  Pain Body Location: back  JW     17:30 Neurological Cognitive - Cognitive/Neuro/Behavioral WDL: .WDL except  Level of Consciousness: alert  Follows Commands: yes  Speech: other (see comments) (will not speak, nods head yes and no) Mood/Behavior: anxious; cooperative         Tests Performed:  Labs and Imaging. Abnormal Results:   Labs Ordered and Resulted from Time of ED Arrival Up to the Time of Departure from the ED   COMPREHENSIVE METABOLIC PANEL - Abnormal; Notable for the following components:       Result Value    Potassium 3.3 (*)     Glucose 104 (*)     Bilirubin Total 1.9 (*)     Protein Total 9.0 (*)     All other components within normal limits   CBC WITH PLATELETS DIFFERENTIAL - Abnormal; Notable for the following components:    WBC 12.3 (*)     Absolute Neutrophil 8.4 (*)     All other components within normal limits   LIPASE - Abnormal; Notable for the following components:    Lipase 59 (*)     All other components within normal limits   ALCOHOL ETHYL   LYME DISEASE GONZÁLEZ WITH REFLEX TO WB SERUM   TSH WITH FREE T4 REFLEX   AMMONIA   ROUTINE UA WITH MICROSCOPIC     XR Chest 2 Views   Final Result   IMPRESSION: Low lung volumes. Heart size and pulmonary vascularity normal. The lungs are clear.      CT Abdomen Pelvis w Contrast   Final Result   IMPRESSION:    1.  There is fluid seen throughout the colon raising question of diarrhea but there is no inflammatory changes of bowel wall.   2.  Mild fatty infiltration of liver.      CT Head w/o Contrast   Final Result   IMPRESSION:   1.  No CT finding of a mass, hemorrhage or focal area suggestive of acute infarct.      .   Treatments provided: See MAR  Family Comments: Father at bedside.   OBS brochure/video discussed/provided to patient:  N/A  ED Medications:   Medications   0.9% sodium chloride BOLUS (1,000 mLs Intravenous New Bag 7/8/21 2130)     Followed by   sodium chloride 0.9% infusion (has no administration in time range)   0.9% sodium chloride BOLUS (1,000 mLs Intravenous New Bag 7/8/21 1807)   sodium chloride (PF) 0.9% PF flush 100 mL (65 mLs Intravenous Given 7/8/21 1827)   iopamidol (ISOVUE-370) solution 500 mL (100 mLs Intravenous Given 7/8/21 1826)     Drips infusing:  No  For the majority of the shift, the patient's behavior Green.  Interventions performed were N/A.    Sepsis treatment initiated: No     Patient tested for COVID 19 prior to admission: YES    ED Nurse Name/Phone Number: Flor Wong RN,   11:57 PM  RECEIVING UNIT ED HANDOFF REVIEW    Above ED Nurse Handoff Report was reviewed: Yes  Reviewed by: Sebastian Aguirre RN on July 9, 2021 at 12:20 AM

## 2021-07-10 ENCOUNTER — APPOINTMENT (OUTPATIENT)
Dept: GENERAL RADIOLOGY | Facility: CLINIC | Age: 26
DRG: 071 | End: 2021-07-10
Attending: INTERNAL MEDICINE
Payer: COMMERCIAL

## 2021-07-10 LAB
APPEARANCE CSF: CLEAR
C GATTII+NEOFOR DNA CSF QL NAA+NON-PROBE: NEGATIVE
CMV DNA CSF QL NAA+NON-PROBE: NEGATIVE
COLOR CSF: COLORLESS
E COLI K1 DNA CSF QL NAA+NON-PROBE: NEGATIVE
EV RNA CSF QL NAA+NON-PROBE: NEGATIVE
GLUCOSE BLDC GLUCOMTR-MCNC: 100 MG/DL (ref 70–99)
GLUCOSE CSF-MCNC: 72 MG/DL (ref 40–70)
GP B STREP DNA CSF QL NAA+NON-PROBE: NEGATIVE
GRAM STN SPEC: NORMAL
HAEM INFLU DNA CSF QL NAA+NON-PROBE: NEGATIVE
HHV6 DNA CSF QL NAA+NON-PROBE: NEGATIVE
HSV1 DNA CSF QL NAA+NON-PROBE: NEGATIVE
HSV1 DNA CSF QL NAA+PROBE: NOT DETECTED
HSV2 DNA CSF QL NAA+NON-PROBE: NEGATIVE
HSV2 DNA CSF QL NAA+PROBE: NOT DETECTED
L MONOCYTOG DNA CSF QL NAA+NON-PROBE: NEGATIVE
LABORATORY COMMENT REPORT: NORMAL
Lab: NORMAL
MICROBIOLOGIST REVIEW: NORMAL
MISCELLANEOUS TEST: NORMAL
N MEN DNA CSF QL NAA+NON-PROBE: NEGATIVE
PARECHOVIRUS A RNA CSF QL NAA+NON-PROBE: NEGATIVE
PROT CSF-MCNC: 49 MG/DL (ref 15–60)
RBC # CSF MANUAL: 0 /UL (ref 0–2)
RBC # CSF MANUAL: NORMAL /UL (ref 0–2)
S PNEUM DNA CSF QL NAA+NON-PROBE: NEGATIVE
SPECIMEN SOURCE: NORMAL
TUBE # CSF: 4 #
VZV DNA CSF QL NAA+NON-PROBE: NEGATIVE
WBC # CSF MANUAL: 0 /UL (ref 0–5)
WBC # CSF MANUAL: NORMAL /UL (ref 0–5)

## 2021-07-10 PROCEDURE — 87529 HSV DNA AMP PROBE: CPT | Performed by: PSYCHIATRY & NEUROLOGY

## 2021-07-10 PROCEDURE — 77003 FLUOROGUIDE FOR SPINE INJECT: CPT

## 2021-07-10 PROCEDURE — 82042 OTHER SOURCE ALBUMIN QUAN EA: CPT | Performed by: PSYCHIATRY & NEUROLOGY

## 2021-07-10 PROCEDURE — 86255 FLUORESCENT ANTIBODY SCREEN: CPT | Performed by: PSYCHIATRY & NEUROLOGY

## 2021-07-10 PROCEDURE — 82784 ASSAY IGA/IGD/IGG/IGM EACH: CPT | Performed by: PSYCHIATRY & NEUROLOGY

## 2021-07-10 PROCEDURE — 009U3ZX DRAINAGE OF SPINAL CANAL, PERCUTANEOUS APPROACH, DIAGNOSTIC: ICD-10-PCS | Performed by: RADIOLOGY

## 2021-07-10 PROCEDURE — 36415 COLL VENOUS BLD VENIPUNCTURE: CPT | Performed by: INTERNAL MEDICINE

## 2021-07-10 PROCEDURE — 999N001017 HC STATISTIC GLUCOSE BY METER IP

## 2021-07-10 PROCEDURE — 87798 DETECT AGENT NOS DNA AMP: CPT | Performed by: INTERNAL MEDICINE

## 2021-07-10 PROCEDURE — 62270 DX LMBR SPI PNXR: CPT

## 2021-07-10 PROCEDURE — 87015 SPECIMEN INFECT AGNT CONCNTJ: CPT | Performed by: PSYCHIATRY & NEUROLOGY

## 2021-07-10 PROCEDURE — 99207 PR CDG-MDM COMPONENT: MEETS MODERATE - UP CODED: CPT | Performed by: INTERNAL MEDICINE

## 2021-07-10 PROCEDURE — 84157 ASSAY OF PROTEIN OTHER: CPT | Performed by: PSYCHIATRY & NEUROLOGY

## 2021-07-10 PROCEDURE — 83520 IMMUNOASSAY QUANT NOS NONAB: CPT | Performed by: PSYCHIATRY & NEUROLOGY

## 2021-07-10 PROCEDURE — 89050 BODY FLUID CELL COUNT: CPT | Performed by: PSYCHIATRY & NEUROLOGY

## 2021-07-10 PROCEDURE — 87205 SMEAR GRAM STAIN: CPT | Performed by: PSYCHIATRY & NEUROLOGY

## 2021-07-10 PROCEDURE — 36415 COLL VENOUS BLD VENIPUNCTURE: CPT | Performed by: PSYCHIATRY & NEUROLOGY

## 2021-07-10 PROCEDURE — 87070 CULTURE OTHR SPECIMN AEROBIC: CPT | Performed by: PSYCHIATRY & NEUROLOGY

## 2021-07-10 PROCEDURE — 258N000001 HC RX 258: Performed by: INTERNAL MEDICINE

## 2021-07-10 PROCEDURE — 82945 GLUCOSE OTHER FLUID: CPT | Performed by: PSYCHIATRY & NEUROLOGY

## 2021-07-10 PROCEDURE — 87483 CNS DNA AMP PROBE TYPE 12-25: CPT | Performed by: PSYCHIATRY & NEUROLOGY

## 2021-07-10 PROCEDURE — 83916 OLIGOCLONAL BANDS: CPT | Performed by: PSYCHIATRY & NEUROLOGY

## 2021-07-10 PROCEDURE — 82040 ASSAY OF SERUM ALBUMIN: CPT | Performed by: PSYCHIATRY & NEUROLOGY

## 2021-07-10 PROCEDURE — 99233 SBSQ HOSP IP/OBS HIGH 50: CPT | Performed by: INTERNAL MEDICINE

## 2021-07-10 PROCEDURE — 83519 RIA NONANTIBODY: CPT | Performed by: PSYCHIATRY & NEUROLOGY

## 2021-07-10 PROCEDURE — 120N000001 HC R&B MED SURG/OB

## 2021-07-10 RX ORDER — POLYETHYLENE GLYCOL 3350 17 G/17G
17 POWDER, FOR SOLUTION ORAL DAILY
Status: DISCONTINUED | OUTPATIENT
Start: 2021-07-10 | End: 2021-07-13 | Stop reason: HOSPADM

## 2021-07-10 RX ADMIN — POTASSIUM CHLORIDE, DEXTROSE MONOHYDRATE AND SODIUM CHLORIDE: 150; 5; 900 INJECTION, SOLUTION INTRAVENOUS at 06:24

## 2021-07-10 ASSESSMENT — ACTIVITIES OF DAILY LIVING (ADL)
ADLS_ACUITY_SCORE: 24
ADLS_ACUITY_SCORE: 22
ADLS_ACUITY_SCORE: 22
ADLS_ACUITY_SCORE: 24
ADLS_ACUITY_SCORE: 22
ADLS_ACUITY_SCORE: 22

## 2021-07-10 NOTE — CONSULTS
Psychiatry Service  Patient undergoing neurological workup with   LP this am, currently on bedrest.  He will be seen after results or on  7/11. Prn seroquel ordered for agitation/confusion. Call prn.

## 2021-07-10 NOTE — PROGRESS NOTES
PT: chart reviewed and approached patient for PT eval at 1015. Checked in with RN who reports patient going to a procedure at this time. RN does note that patient has been able to follow some commands but is presenting similarly to previous day.

## 2021-07-10 NOTE — PLAN OF CARE
Tachy hr-Dr aware. Slightly elevated bps. Lungs clr-room air high 90s. Shallow breathing. Hypoactive bs/+gas, no nausea. Last bm unknown. Unable to void-bladder scanned for 702 ml & straight cathed for 1275 at 0135. Ivf infusing. Cms-Left weaker than right-can follow commands-flexes, bends, & wiggles (toes). Skin intact. Denies pain. Generalized weakness. Tolerating repositioning. Remains non-verbal, communicating with head nods. Withdrawn. Father at bedside. No other significant issues noted overnight.

## 2021-07-10 NOTE — PROCEDURES
[facility]    Physician: Thomas Feng M.D.    Procedure: Diagnostic lumbar puncture    Fluoroscopy time: 0.1 minute    Estimated blood loss: Minimal    Complications: None evident    Preliminary findings:  (Please see dictation for full detail)  - L3-L4 lumbar puncture, 22 G spinal needle  - 12 cc clear CSF collected and sent for analysis    Plan:  - Bed rest for 2 hours.  - Post procedure monitoring.  - Report to ordering provider.    Report completed by Thomas Feng MD

## 2021-07-10 NOTE — PROGRESS NOTES
[facility]  Hospitalist Progress Note  Lee Pineda MD 07/10/2021    Reason for Stay (Diagnosis): Generalized weakness, fatigue, difficulty sleeping, very minimal communication.         Assessment and Plan:      Summary of Stay: Hamilton Mitchell is a 25 year old male, without significant past medical history who presents to the emergency room with progressive decline including generalized weakness, fatigue, difficulty sleeping, decreased oral intake, family reported minimal communication for the last 4 weeks and admitted to the hospital .     Patient does not give any history, his father at bedside who stated he was told different emergency room 4 times, all the time she was told he had no medical conditions and discharged back home.  His father also stated that the symptoms might have started after he had alcohol drink about 4 weeks ago, though he is not sure if is related.  All the work-up done including labs and imaging in the recent past were without any abnormalities, his lab work was notable for mild mild leukocytosis, and elevation of bilirubin.  No clear cause for symptoms has been identified.      1.    Subacute encephalopathy: It started about 4 weeks ago and has been worsening.  There was no seizure signs, no tremors, no loss of control of his urine or bowel.  Patient is almost mute does not give any history.  I suspect more of psychiatric cause is neurologic or medical issues.  Toxicology studies done so far negative.  Neurology is consulted, also psychiatrist was consulted.  -Patient is not opening his mouth, unable to drink, currently is on IV fluids.  He is in semisitting position, opening his eyes, not in any form of distress.   -His father stated when he wakes up from sleep he is anxious and holds his father and and tells him he lives with him and asks not to leave him alone.  -He also see his team's notes were not there, this is more likely psychiatric condition.  At this  point as patient is not talking unable to give any detailed history and to sort out possible psychiatric condition.    Today.  -Patient was alone no family member at bedside.  He was able to verbalize few words.  He stated he has hallucination, he did not clarify further if he hears voices directly talking to him and and instructing him or if he is hearing the voice of the third person.  He also stated he sees objects but did not give any details.  Upon further questioning he stopped talking and asked continue to stare.  -Lumbar puncture is done RBC and WBC 0, glucose 70 total protein is 49.  No sign of infection, further test results are pending.  -EEG was ordered at this point not possible to be done here.  -Patient remained n.p.o. as he does not open his mouth.  He is getting fluid hydration  -CRP 16, vitamin B12 267.  TSH is 2.04, ammonia was 15.  -Patient received consulted, but not sure how much this could be helpful as patient does not give history at this time.  -MRI of the brain did not show any acute intracranial process somewhat limited due to motion.,    2.  Leukocytosis, mild.  No clear infection source, and has had an elevated white blood cell count on previous ER visits.  -Is likely stress related.  -Continue to monitor  3.  Mild bilirubin elevation: Is consistent with previous studies  -There is no other findings to suggest liver abnormality. Could be Gilbert's.  4.  Mild hypokalemia, due to decreased oral intake.  Potassium will be replaced.    5.  Dehydration with poor oral intake.  6.  Generalized weakness and deconditioning    DVT Prophylaxis: Pneumatic Compression Devices and Ambulate every shift  Code Status: Full Code  Discharge Dispo: Home  Estimated Disch Date / # of Days until Disch: More than 2 nights pending improvement.  I I took my time and start by his bedside and talk with him at length, he tried to open up with few words when on further questioning he stopped talking and became mute  "and continued to look at me with a fixed gaze.         Interval History (Subjective):      Patient seen and examined, no new overnight issues, no complaints, does not give history, after staying with him for sometimes he said he has hallucination but did not give any details.                  Physical Exam:      Last Vital Signs:  BP (!) 155/98 (BP Location: Left arm)   Pulse 108   Temp 98.2  F (36.8  C) (Temporal)   Resp (!) 32   Ht 1.727 m (5' 8\")   Wt 101.7 kg (224 lb 1.6 oz)   SpO2 97%   BMI 34.07 kg/m      I/O last 3 completed shifts:  In: 932 [I.V.:932]  Out: 1275 [Urine:1275]  Vitals:    07/09/21 0904   Weight: 101.7 kg (224 lb 1.6 oz)     Current Facility-Administered Medications   Medication     acetaminophen (TYLENOL) tablet 650 mg     dextrose 5% and 0.9% NaCl with potassium chloride 20 mEq infusion     glucose gel 15-30 g    Or     dextrose 50 % injection 25-50 mL    Or     glucagon injection 1 mg     HYDROmorphone (DILAUDID) injection 0.5 mg     lidocaine (LMX4) cream     lidocaine 1 % 0.1-1 mL     melatonin tablet 1 mg     ondansetron (ZOFRAN-ODT) ODT tab 4 mg    Or     ondansetron (ZOFRAN) injection 4 mg     polyethylene glycol (MIRALAX) Packet 17 g     polyethylene glycol (MIRALAX) Packet 17 g     senna-docusate (SENOKOT-S/PERICOLACE) 8.6-50 MG per tablet 1 tablet    Or     senna-docusate (SENOKOT-S/PERICOLACE) 8.6-50 MG per tablet 2 tablet     sodium chloride (PF) 0.9% PF flush 3 mL     sodium chloride (PF) 0.9% PF flush 3 mL     sodium chloride 0.9% infusion       Constitutional: Awake, keeps eye contact, does respond to questions her body but by moving his eyes or his head.  Calm, no agitation   Respiratory: Clear to auscultation bilaterally, no crackles or wheezing   Cardiovascular: Regular rate and rhythm, normal S1 and S2, and no murmur noted   Abdomen: Normal bowel sounds, soft, non-distended, non-tender   Skin: No rashes, no cyanosis, dry to touch   Neuro: Alert and oriented x3, no " weakness, numbness, memory loss   Extremities: No edema, normal range of motion   Other(s):HEENT Pink, nonicteric, moist oral mucosa       All other systems: Negative          Medications:      All current medications were reviewed with changes reflected in problem list.         Data:      All new lab and imaging data was reviewed.   Labs:  Recent Labs   Lab 07/09/21 0852 07/08/21  1746   WBC 11.1* 12.3*   HGB 13.8 15.0   HCT 42.4 46.4   MCV 93 93    395     Recent Labs   Lab 07/09/21  0852 07/08/21  1746    140   POTASSIUM 3.5 3.3*   CHLORIDE 110* 104   CO2 24 26   ANIONGAP 9 10   GLC 92 104*   BUN 10 18   CR 0.68 0.96   GFRESTIMATED >90 >90   GFRESTBLACK >90 >90   KUNAL 9.3 9.9   PROTTOTAL  --  9.0*   ALBUMIN  --  4.5   BILITOTAL  --  1.9*   ALKPHOS  --  78   AST  --  20   ALT  --  44     Recent Labs   Lab 07/09/21  0852 07/08/21  1746    140   POTASSIUM 3.5 3.3*   CHLORIDE 110* 104   CO2 24 26   GLC 92 104*     Recent Labs   Lab 07/10/21  0132 07/09/21  1759 07/09/21  1729 07/09/21  1459 07/09/21  0920 07/09/21  0852 07/08/21  1746   GLC  --   --   --   --   --  92 104*   * 91 79 90 90  --   --       Imaging:   Results for orders placed or performed during the hospital encounter of 07/08/21   CT Head w/o Contrast    Narrative    EXAM: CT HEAD W/O CONTRAST  LOCATION: [facility]  DATE/TIME: 7/8/2021 6:25 PM    INDICATION: Mental status change, unknown cause  COMPARISON: None.  TECHNIQUE: Routine CT Head without IV contrast. Multiplanar reformats. Dose reduction techniques were used.    FINDINGS:  INTRACRANIAL CONTENTS: No intracranial hemorrhage, extraaxial collection, or mass effect.  No CT evidence of acute infarct. Normal parenchymal attenuation. Normal ventricles and sulci.     VISUALIZED ORBITS/SINUSES/MASTOIDS: No intraorbital abnormality. No paranasal sinus mucosal disease. No middle ear or mastoid effusion.    BONES/SOFT TISSUES: No acute abnormality.      Impression     IMPRESSION:  1.  No CT finding of a mass, hemorrhage or focal area suggestive of acute infarct.   XR Chest 2 Views    Narrative    EXAM: XR CHEST 2 VW  LOCATION: [facility]  DATE/TIME: 7/8/2021 6:48 PM    INDICATION: Weakness  COMPARISON: None.      Impression    IMPRESSION: Low lung volumes. Heart size and pulmonary vascularity normal. The lungs are clear.   CT Abdomen Pelvis w Contrast    Narrative    EXAM: CT ABDOMEN PELVIS W CONTRAST  LOCATION: [facility]  DATE/TIME: 7/8/2021 6:24 PM    INDICATION: Abdominal  COMPARISON: None.  TECHNIQUE: CT scan of the abdomen and pelvis was performed following injection of IV contrast. Multiplanar reformats were obtained. Dose reduction techniques were used.  CONTRAST: 100 mL Isovue-370    FINDINGS:   LOWER CHEST: Normal.    HEPATOBILIARY: Diffuse fatty infiltration of liver. Mild motion artifact.    PANCREAS: Normal.    SPLEEN: Normal.    ADRENAL GLANDS: Normal.    KIDNEYS/BLADDER: Mild motion artifact. No stones or hydronephrosis.    BOWEL: No obstruction or inflammatory change. Appendix normal. Mild motion artifact. Fluid seen throughout the colon suggesting diarrhea but no inflammatory wall thickening.    LYMPH NODES: Normal.    VASCULATURE: Unremarkable.    PELVIC ORGANS: Normal.    MUSCULOSKELETAL: Normal.      Impression    IMPRESSION:   1.  There is fluid seen throughout the colon raising question of diarrhea but there is no inflammatory changes of bowel wall.  2.  Mild fatty infiltration of liver.   MR Brain w/o & w Contrast    Narrative    MRI BRAIN WITHOUT AND WITH CONTRAST  7/9/2021 10:58 AM     HISTORY:  Mental status change, unknown cause.     TECHNIQUE:  Multiplanar, multisequence MRI of the brain without and  with 10 mL Gadavist.     COMPARISON: CT head 7/8/2021.     FINDINGS: There is moderate motion-related artifact on several  sequences, which somewhat limits the exam. There is no definite  abnormal intracranial restricted  diffusion to suggest acute infarct.  The ventricles are normal in size and configuration. No definite  intracranial hemorrhage, extra-axial fluid collection, or definite  mass lesion. There is no significant mass effect or shift/herniation.  No gross abnormal postcontrast enhancement is identified.    No gross abnormality of the visualized skull base, mid face or  calvarium.      Impression    IMPRESSION: Somewhat motion-limited exam without definite findings of  acute intracranial process.    BOLIVAR YAN MD         SYSTEM ID:  [id number]

## 2021-07-10 NOTE — PROGRESS NOTES
Have been in contact with nurse throughout morning about getting EEG done asap. Unfortunately at this time [facility] does not have someone that can perform this procedure. A call has been sent out to all employees that are experienced in this procedure to come in to work. At this point no one is available. After talking with nurse, the best option at this time would be to transfer patient to a facility with in house neurology and EEG capabilities asap. [facility] will continue to be in contact with nursing.

## 2021-07-10 NOTE — PLAN OF CARE
Day RN  VS monitored, tachycardia, tachypnea, int responds to yes/no questions if asked repeatedly, more responsive if questioned by dad and brother, able to swallow a few small sips from a syringe but nothing more, not safe for further PO intake, flat affect, often stares at you with a fixed gaze, does track with his eyes, c/o pain after LP, ice and repositioning, refusing Tylenol supp, trying to avoid IV Dilaudid if possible, unable to void, straight cath at 1300 for 700 ml, family updated at b/s, will cont to monitor.

## 2021-07-11 ENCOUNTER — APPOINTMENT (OUTPATIENT)
Dept: PHYSICAL THERAPY | Facility: CLINIC | Age: 26
DRG: 071 | End: 2021-07-11
Attending: INTERNAL MEDICINE
Payer: COMMERCIAL

## 2021-07-11 LAB
ALBUMIN SERPL-MCNC: 3.9 G/DL (ref 3.4–5)
ALP SERPL-CCNC: 68 U/L (ref 40–150)
ALT SERPL W P-5'-P-CCNC: 38 U/L (ref 0–70)
ANION GAP SERPL CALCULATED.3IONS-SCNC: 6 MMOL/L (ref 3–14)
AST SERPL W P-5'-P-CCNC: 23 U/L (ref 0–45)
BILIRUB SERPL-MCNC: 1.5 MG/DL (ref 0.2–1.3)
BUN SERPL-MCNC: 9 MG/DL (ref 7–30)
CALCIUM SERPL-MCNC: 9.6 MG/DL (ref 8.5–10.1)
CHLORIDE BLD-SCNC: 106 MMOL/L (ref 94–109)
CO2 SERPL-SCNC: 27 MMOL/L (ref 20–32)
CREAT SERPL-MCNC: 0.66 MG/DL (ref 0.66–1.25)
GFR SERPL CREATININE-BSD FRML MDRD: >90 ML/MIN/1.73M2
GLUCOSE BLD-MCNC: 140 MG/DL (ref 70–99)
PARANEOPLASTIC AB SER QL IF: NORMAL
POTASSIUM BLD-SCNC: 3.2 MMOL/L (ref 3.4–5.3)
PROT SERPL-MCNC: 8.4 G/DL (ref 6.8–8.8)
SODIUM SERPL-SCNC: 139 MMOL/L (ref 133–144)

## 2021-07-11 PROCEDURE — 120N000001 HC R&B MED SURG/OB

## 2021-07-11 PROCEDURE — 258N000001 HC RX 258: Performed by: INTERNAL MEDICINE

## 2021-07-11 PROCEDURE — 99233 SBSQ HOSP IP/OBS HIGH 50: CPT | Performed by: INTERNAL MEDICINE

## 2021-07-11 PROCEDURE — 36415 COLL VENOUS BLD VENIPUNCTURE: CPT | Performed by: INTERNAL MEDICINE

## 2021-07-11 PROCEDURE — 97530 THERAPEUTIC ACTIVITIES: CPT | Mod: GP | Performed by: PHYSICAL THERAPIST

## 2021-07-11 PROCEDURE — 250N000013 HC RX MED GY IP 250 OP 250 PS 637: Performed by: INTERNAL MEDICINE

## 2021-07-11 PROCEDURE — 97162 PT EVAL MOD COMPLEX 30 MIN: CPT | Mod: GP | Performed by: PHYSICAL THERAPIST

## 2021-07-11 PROCEDURE — 250N000011 HC RX IP 250 OP 636: Performed by: INTERNAL MEDICINE

## 2021-07-11 PROCEDURE — 80053 COMPREHEN METABOLIC PANEL: CPT | Performed by: INTERNAL MEDICINE

## 2021-07-11 PROCEDURE — 99207 PR CDG-MDM COMPONENT: MEETS MODERATE - UP CODED: CPT | Performed by: INTERNAL MEDICINE

## 2021-07-11 RX ORDER — LORAZEPAM 1 MG/1
1 TABLET ORAL 3 TIMES DAILY PRN
Status: DISCONTINUED | OUTPATIENT
Start: 2021-07-11 | End: 2021-07-13 | Stop reason: HOSPADM

## 2021-07-11 RX ORDER — LORAZEPAM 1 MG/1
1 TABLET ORAL
Status: DISCONTINUED | OUTPATIENT
Start: 2021-07-11 | End: 2021-07-12

## 2021-07-11 RX ORDER — POTASSIUM CHLORIDE 7.45 MG/ML
10 INJECTION INTRAVENOUS
Status: COMPLETED | OUTPATIENT
Start: 2021-07-11 | End: 2021-07-12

## 2021-07-11 RX ORDER — QUETIAPINE FUMARATE 25 MG/1
25 TABLET, FILM COATED ORAL 2 TIMES DAILY PRN
Status: DISCONTINUED | OUTPATIENT
Start: 2021-07-11 | End: 2021-07-13 | Stop reason: HOSPADM

## 2021-07-11 RX ORDER — QUETIAPINE FUMARATE 25 MG/1
25 TABLET, FILM COATED ORAL AT BEDTIME
Status: DISCONTINUED | OUTPATIENT
Start: 2021-07-11 | End: 2021-07-12

## 2021-07-11 RX ADMIN — POTASSIUM CHLORIDE 10 MEQ: 7.46 INJECTION, SOLUTION INTRAVENOUS at 21:57

## 2021-07-11 RX ADMIN — POTASSIUM CHLORIDE, DEXTROSE MONOHYDRATE AND SODIUM CHLORIDE: 150; 5; 900 INJECTION, SOLUTION INTRAVENOUS at 14:36

## 2021-07-11 RX ADMIN — DOCUSATE SODIUM AND SENNOSIDES 2 TABLET: 8.6; 5 TABLET, FILM COATED ORAL at 20:44

## 2021-07-11 RX ADMIN — POTASSIUM CHLORIDE 10 MEQ: 7.46 INJECTION, SOLUTION INTRAVENOUS at 20:51

## 2021-07-11 RX ADMIN — ACETAMINOPHEN 650 MG: 325 TABLET, FILM COATED ORAL at 16:52

## 2021-07-11 RX ADMIN — POTASSIUM CHLORIDE 10 MEQ: 7.46 INJECTION, SOLUTION INTRAVENOUS at 23:01

## 2021-07-11 ASSESSMENT — ACTIVITIES OF DAILY LIVING (ADL)
ADLS_ACUITY_SCORE: 20
ADLS_ACUITY_SCORE: 22
ADLS_ACUITY_SCORE: 24

## 2021-07-11 NOTE — DOWNTIME EVENT NOTE
The EMR was down for 4.75 hours on 7/11/2021.    Klaudia Chapin was responsible for completing the paper charting during this time period.     The following information was re-entered into the system by Marina Benítez RN: Flowsheet data    The following information will remain in the paper chart: Labs, assessment flowsheets    Marina Benítez RN  7/11/2021

## 2021-07-11 NOTE — PLAN OF CARE
Day RN  VS monitored, pt more responsive as day progressed, conversing this afternoon, able to feed self liquids and swallowed these without issue, up w/A2 and gb and ww, able to get self to EOB and transfer to recliner taking steps if given time and simple commands for moving, jansen patent, tachycardia, L side weaker than R, EEG done today, family at b/s, will cont to monitor.

## 2021-07-11 NOTE — PLAN OF CARE
"BP (!) 162/97 (BP Location: Left arm)   Pulse 116   Temp 97.7  F (36.5  C) (Temporal)   Resp 19   Ht 1.727 m (5' 8\")   Wt 101.7 kg (224 lb 1.6 oz)   SpO2 100%   BMI 34.07 kg/m    Orientation: Non-verbal, flat, withdrawn, will occationaly answer \"yes/no\" questions but not consistently, often staring with a fixed gaze.  Resp:LS shallow, with periods of tachypnea.  Cardio: hypertensive in the 150's-160's systiolic, tachycardia.  Pain: denies  CMS: JAMILAH  Activity: bedrest, pt not moving  Diet: was able to swallow a few sips via the syringe.  Voiding: jansen cath placed due to retention  Discharge Plan: TBD     "

## 2021-07-11 NOTE — PROGRESS NOTES
[facility]  Hospitalist Progress Note  Lee Pineda MD 07/11/2021    Reason for Stay (Diagnosis): Generalized weakness, fatigue, difficulty sleeping, very minimal communication.         Assessment and Plan:      Summary of Stay: Hamilton Mitchell is a 25 year old male, without significant past medical history who presents to the emergency room with progressive decline including generalized weakness, fatigue, difficulty sleeping, decreased oral intake, family reported minimal communication for the last 4 weeks and admitted to the hospital   Patient does not give any history, his father at bedside who stated he was told different emergency room 4 times, all the time she was told he had no medical conditions and discharged back home.  His father also stated that the symptoms might have started after he had alcohol drink about 4 weeks ago, though he is not sure if is related.  All the work-up done including labs and imaging in the recent past were without any abnormalities, his lab work was notable for mild mild leukocytosis, and elevation of bilirubin.  No clear cause for symptoms has been identified.      1.    Subacute encephalopathy with catatonic symptoms:   -He is just staring but tracking me and his father when we are talking, rarely respond by saying a word.  -He is mostly quiet without giving any history or responding to questions.  -It was reported that it started about 4 weeks ago and has been worsening.  There was no seizure signs, no tremors, no loss of control of his urine or bowel.  Patient is almost mute does not give any history.  I suspect more of psychiatric cause is neurologic or medical issues.  Toxicology studies done so far negative.  Neurology is consulted, also psychiatrist was consulted.  -Patient not opening his mouth, unable to drink, currently on IV fluids.  He is in semisitting position, opening his eyes, not in any form of distress.   -His father stated.  Direct  admission when he is home there is work-up from sleep, he becomes more anxious and used to hold his father's aunts and told him to leave him alone.  -Patient also stated he has hallucinations.  He was able to tell me that he has hallucination.  -This is more likely psychiatric condition, at this point the patient is not talking and unable to give any detailed history to sort out possible psychiatric condition.  -Psychiatry also consulted.  -Lumbar puncture is done RBC and WBC 0, glucose 70 total protein is 49.  No sign of infection, or results so far negative, some are still pending..  -EEG done today, results pending.  -Patient remained n.p.o. as he does not open his mouth and unable to swallow.  He was able to take apple juice by a syringe.  -CRP 16, vitamin B12 267.  TSH is 2.04, ammonia was 15.  -MRI of the brain did not show any acute intracranial process somewhat limited due to motion.    2.  Leukocytosis, mild.  No clear infection source, and has had an elevated white blood cell count on previous ER visits.  -Is likely stress related.  -Continue to monitor.    3.  Mild bilirubin elevation: Is consistent with previous studies  -There is no other findings to suggest liver abnormality. Could be Gilbert's.  4.  Mild hypokalemia, due to decreased oral intake.  Potassium will be replaced.    5.  Dehydration with poor oral intake.  -Continue IV fluids.  6.  Generalized weakness and deconditioning.  -His father stated patient was mostly sitting and playing games.  7.  Nutrition.  Patient is not eating since admission, concerned about his nutrition.  It is not clear for how long he is going on like this without eating as he is not opening his mouth.  -For now he will be on IV fluids.    DVT Prophylaxis: Pneumatic Compression Devices and Ambulate every shift  Code Status: Full Code.  Discharge Dispo: Home.  Estimated Disch Date / # of Days until Disch: More than 3 days pending improvement.  I I took my time and start  "by his bedside and talk with him at length, he tried to open up with few words when on further questioning he stopped talking and became mute and continued to look at me with a fixed gaze.         Interval History (Subjective):      Patient seen and examined, no new overnight issues, no complaints, does not give history, continue to have hallucinations but does not give any details.  Calm, keeps eye contact, no anxiety or agitation. .                  Physical Exam:      Last Vital Signs:  BP (!) 146/96 (BP Location: Left arm)   Pulse 109   Temp 98  F (36.7  C) (Temporal)   Resp 20   Ht 1.727 m (5' 8\")   Wt 101.7 kg (224 lb 1.6 oz)   SpO2 98%   BMI 34.07 kg/m      I/O last 3 completed shifts:  In: 0   Out: 1100 [Urine:1100]  Vitals:    07/09/21 0904   Weight: 101.7 kg (224 lb 1.6 oz)     Current Facility-Administered Medications   Medication     acetaminophen (TYLENOL) tablet 650 mg     dextrose 5% and 0.9% NaCl with potassium chloride 20 mEq infusion     glucose gel 15-30 g    Or     dextrose 50 % injection 25-50 mL    Or     glucagon injection 1 mg     HYDROmorphone (DILAUDID) injection 0.5 mg     lidocaine (LMX4) cream     lidocaine 1 % 0.1-1 mL     LORazepam (ATIVAN) tablet 1 mg     LORazepam (ATIVAN) tablet 1 mg     melatonin tablet 1 mg     ondansetron (ZOFRAN-ODT) ODT tab 4 mg    Or     ondansetron (ZOFRAN) injection 4 mg     polyethylene glycol (MIRALAX) Packet 17 g     polyethylene glycol (MIRALAX) Packet 17 g     QUEtiapine (SEROquel) tablet 25 mg     QUEtiapine (SEROquel) tablet 25 mg     senna-docusate (SENOKOT-S/PERICOLACE) 8.6-50 MG per tablet 1 tablet    Or     senna-docusate (SENOKOT-S/PERICOLACE) 8.6-50 MG per tablet 2 tablet     sodium chloride (PF) 0.9% PF flush 3 mL     sodium chloride (PF) 0.9% PF flush 3 mL     sodium chloride 0.9% infusion       Constitutional: Awake, keeps eye contact, does respond to questions her body but by moving his eyes or his head.  Calm, no agitation "   Respiratory: Clear to auscultation bilaterally, no crackles or wheezing   Cardiovascular: Regular rate and rhythm, normal S1 and S2, and no murmur noted   Abdomen: Normal bowel sounds, soft, non-distended, non-tender   Skin: No rashes, no cyanosis, dry to touch   Neuro: Alert and oriented x3, no weakness, numbness, memory loss   Extremities: No edema, normal range of motion   Other(s):HEENT Pink, nonicteric, moist oral mucosa.       All other systems: Negative.          Medications:      All current medications were reviewed with changes reflected in problem list.         Data:      All new lab and imaging data was reviewed.   Labs:  Recent Labs   Lab 07/09/21  0852 07/08/21  1746   WBC 11.1* 12.3*   HGB 13.8 15.0   HCT 42.4 46.4   MCV 93 93    395     Recent Labs   Lab 07/09/21  0852 07/08/21  1746    140   POTASSIUM 3.5 3.3*   CHLORIDE 110* 104   CO2 24 26   ANIONGAP 9 10   GLC 92 104*   BUN 10 18   CR 0.68 0.96   GFRESTIMATED >90 >90   GFRESTBLACK >90 >90   KUNAL 9.3 9.9   PROTTOTAL  --  9.0*   ALBUMIN  --  4.5   BILITOTAL  --  1.9*   ALKPHOS  --  78   AST  --  20   ALT  --  44     Recent Labs   Lab 07/09/21  0852 07/08/21  1746    140   POTASSIUM 3.5 3.3*   CHLORIDE 110* 104   CO2 24 26   GLC 92 104*     Recent Labs   Lab 07/10/21  0132 07/09/21  1759 07/09/21  1729 07/09/21  1459 07/09/21  0920 07/09/21  0852 07/08/21  1746   GLC  --   --   --   --   --  92 104*   * 91 79 90 90  --   --       Imaging:   Results for orders placed or performed during the hospital encounter of 07/08/21   CT Head w/o Contrast    Narrative    EXAM: CT HEAD W/O CONTRAST  LOCATION: [facility]  DATE/TIME: 7/8/2021 6:25 PM    INDICATION: Mental status change, unknown cause  COMPARISON: None.  TECHNIQUE: Routine CT Head without IV contrast. Multiplanar reformats. Dose reduction techniques were used.    FINDINGS:  INTRACRANIAL CONTENTS: No intracranial hemorrhage, extraaxial collection, or mass  effect.  No CT evidence of acute infarct. Normal parenchymal attenuation. Normal ventricles and sulci.     VISUALIZED ORBITS/SINUSES/MASTOIDS: No intraorbital abnormality. No paranasal sinus mucosal disease. No middle ear or mastoid effusion.    BONES/SOFT TISSUES: No acute abnormality.      Impression    IMPRESSION:  1.  No CT finding of a mass, hemorrhage or focal area suggestive of acute infarct.   XR Chest 2 Views    Narrative    EXAM: XR CHEST 2 VW  LOCATION: [facility]  DATE/TIME: 7/8/2021 6:48 PM    INDICATION: Weakness  COMPARISON: None.      Impression    IMPRESSION: Low lung volumes. Heart size and pulmonary vascularity normal. The lungs are clear.   CT Abdomen Pelvis w Contrast    Narrative    EXAM: CT ABDOMEN PELVIS W CONTRAST  LOCATION: [facility]  DATE/TIME: 7/8/2021 6:24 PM    INDICATION: Abdominal  COMPARISON: None.  TECHNIQUE: CT scan of the abdomen and pelvis was performed following injection of IV contrast. Multiplanar reformats were obtained. Dose reduction techniques were used.  CONTRAST: 100 mL Isovue-370    FINDINGS:   LOWER CHEST: Normal.    HEPATOBILIARY: Diffuse fatty infiltration of liver. Mild motion artifact.    PANCREAS: Normal.    SPLEEN: Normal.    ADRENAL GLANDS: Normal.    KIDNEYS/BLADDER: Mild motion artifact. No stones or hydronephrosis.    BOWEL: No obstruction or inflammatory change. Appendix normal. Mild motion artifact. Fluid seen throughout the colon suggesting diarrhea but no inflammatory wall thickening.    LYMPH NODES: Normal.    VASCULATURE: Unremarkable.    PELVIC ORGANS: Normal.    MUSCULOSKELETAL: Normal.      Impression    IMPRESSION:   1.  There is fluid seen throughout the colon raising question of diarrhea but there is no inflammatory changes of bowel wall.  2.  Mild fatty infiltration of liver.   MR Brain w/o & w Contrast    Narrative    MRI BRAIN WITHOUT AND WITH CONTRAST  7/9/2021 10:58 AM     HISTORY:  Mental status change, unknown  cause.     TECHNIQUE:  Multiplanar, multisequence MRI of the brain without and  with 10 mL Gadavist.     COMPARISON: CT head 7/8/2021.     FINDINGS: There is moderate motion-related artifact on several  sequences, which somewhat limits the exam. There is no definite  abnormal intracranial restricted diffusion to suggest acute infarct.  The ventricles are normal in size and configuration. No definite  intracranial hemorrhage, extra-axial fluid collection, or definite  mass lesion. There is no significant mass effect or shift/herniation.  No gross abnormal postcontrast enhancement is identified.    No gross abnormality of the visualized skull base, mid face or  calvarium.      Impression    IMPRESSION: Somewhat motion-limited exam without definite findings of  acute intracranial process.    BOLIVAR YAN MD         SYSTEM ID:  [id number]

## 2021-07-11 NOTE — PROGRESS NOTES
"   07/11/21 1106   Quick Adds   Type of Visit Initial PT Evaluation   Living Environment   Current Living Arrangements house   Home Accessibility stairs within home   Number of Stairs, Within Home, Primary greater than 10 stairs   Stair Railings, Within Home, Primary railing on right side (ascending)   Transportation Anticipated family or friend will provide   Self-Care   Usual Activity Tolerance good   Current Activity Tolerance fair   Regular Exercise No   Equipment Currently Used at Home none   Activity/Exercise/Self-Care Comment at baseline, patient was independent without an assistive device   Disability/Function   Fall history within last six months no   General Information   Onset of Illness/Injury or Date of Surgery 07/08/21   Referring Physician Senthil Vides MD    Patient/Family Therapy Goals Statement (PT) return to prior level    Pertinent History of Current Problem (include personal factors and/or comorbidities that impact the POC) per chart review, \"Hamilton Mitchell is a 25 year old male, without significant past medical history who presents to the emergency room with progressive decline including generalized weakness, fatigue, difficulty sleeping, decreased oral intake, family reported minimal communication for the last 4 weeks and admitted to the hospital\"   Existing Precautions/Restrictions fall   General Observations patient received seated in chair. occasionally visually tracks therapist in room but does not verbalize. Father in room and agreeable to PT evaluation   Cognition   Orientation Status (Cognition) unable/difficult to assess  (patient did not nod or verbalize throughout session)   Affect/Mental Status (Cognition) unable/difficult to assess   Follows Commands (Cognition) does not follow one-step commands;follows one-step commands   Cognitive Status Comments patient was not following commands for toe wiggles; unclear if following commands for R foot plantarflexion or if activating " plantar reflex. Unable to elicit movement to command to LLE. In functional movement, patient demonstrating improved command following, able to maintain positioning and engage in activity.    Pain Assessment   Patient Currently in Pain No   Range of Motion (ROM)   ROM Comment BLEs grossly WFL to passive knee extension, to neutral with ankle dorsiflexion   Strength   Strength Comments not able to follow commands for manual muscle testing. Unclear if deficits in strength or in initiation upon standing. Given medical history and documented inability to ambulate upon admission, assume some level of deficit   Bed Mobility   Comment (Bed Mobility) did not formally assess, as patient seated in chair at session beginning and end. nursing did utilize a lift to transfer patient to chair   Transfers   Transfers sit-stand transfer   Transfer Safety Concerns Noted losing balance backward   Sit-Stand Transfer   Sit-Stand [place] (Transfers) maximum assist (25% patient effort)   Assistive Device (Sit-Stand Transfers) walker, front-wheeled   Gait/Stairs (Locomotion)   Comment (Gait/Stairs) did not assess secondary to decreased standing balance.    Balance   Balance Comments patient with minimal righting reactions with ankle strategy in standing but unable to return self to midline   Sensory Examination   Sensory Perception Comments unable to formally assess as patient not verbalizing or completing head nods. Does not react to therapist touching feet or lower leg   Coordination   Coordination Comments at session beginning, much difficulty initating movement. With functional mobility and verbal cues, patient improving ability to maintain and initiate movement.    Muscle Tone   Muscle Tone Comments no overt tone outside normal bounds with passive range to BLEs.    Clinical Impression   Criteria for Skilled Therapeutic Intervention yes, treatment indicated   PT Diagnosis (PT) impaired functional mobility   Influenced by the  following impairments assumed deficits in strength with noted deficits in balance, functional mobility tolerance. Awareness of deficits unclear   Functional limitations due to impairments bed mobility, transfers, gait, stairs   Clinical Presentation Evolving/Changing   Clinical Presentation Rationale ongoing medical investigation   Clinical Decision Making (Complexity) moderate complexity   Therapy Frequency (PT) 4x/week   Predicted Duration of Therapy Intervention (days/wks) 4 days   Planned Therapy Interventions (PT) balance training;bed mobility training;gait training;neuromuscular re-education;home exercise program;motor coordination training;patient/family education;postural re-education;ROM (range of motion);stair training;strengthening;stretching;transfer training;progressive activity/exercise;risk factor education;home program guidelines   Anticipated Equipment Needs at Discharge (PT) walker, rolling   Risk & Benefits of therapy have been explained evaluation/treatment results reviewed;care plan/treatment goals reviewed;risks/benefits reviewed;patient;father   PT Discharge Planning    PT Discharge Recommendation (DC Rec) [facility] patient will benefit from intensive, interdisciplinary therapy.  Anticipate will be able to tolerate 3 hours of therapy per day   PT Rationale for DC Rec patient is well below his baseline function of fully indepenent without an assisitve device, currently requiring a lift to transfer to the chair and max assist to stand. patient has excellent family support, multidisciplinary needs, and a high prior level of function. Given this, recommend ARU upon discharge if patient continues to be able to participate in functional mobility, to maximize mobility, independence, safety, and address deficits in strength, balance, and movement initiation.    PT Brief overview of current status  recommend nursing utilize lift    Total Evaluation Time   Total Evaluation Time  (Minutes) 15

## 2021-07-11 NOTE — PROGRESS NOTES
Patient remains unchanged. He is in a catatonic state.  CSF normal.  EEG- normal.  Unlikely to be autoimmune encephalitis.  Most likely psychiatric disorder.  Follow psychiatrist's recommendations for the medications.  I will follow him peripherally.

## 2021-07-11 NOTE — CONSULTS
Consult Date: 07/10/2021    IDENTIFICATION:  The patient is a 25-year-old male seen for psychiatric consultation at the request of Dr. Pineda for this patient admitted with progressive confusion and change in behavior.    HISTORY OF PRESENT ILLNESS:  The patient had been admitted through [facility] where he had been brought from his father's home where he resides.  He had been unable to give any meaningful history, but his father had reported that he had begun drinking alcohol more regularly during this month.  The patient had physical complaints of nausea, abdominal distress, vomiting.  He had been awakening with reported anxiety and at other times appeared confused to his family.  He had been seen at the urgent care center and [facility] ED during this past month and had been treated and released.  Currently on General Medical unit and has been seen for Neurological evaluation by Dr. Ulloa.  The patient had been seen briefly after LP yesterday, was on bed rest and minimally responsive.  His laboratory studies had been unremarkable with initial CBC of 12.3 and corrected to 11.1, normal differential, normal glucose, a chemistry ammonia level of 15, lipase of 59 and a bilirubin of 1.9.  B12 level and TSH also normal.  His spinal fluid been unremarkable.  He had no evidence of infection and has had no fever.  His SARS had tested negative.  He had no report of other flu-like symptoms from family.  Urinalysis negative.  No evidence of drug ingestion and urine tox screen negative.  He has no reported history of drug abuse.  No seizure-like activity has been witnessed.    PAST PSYCHIATRIC HISTORY:  No history of past treatment for any mental illness, prior diagnoses or medication trials.  No known history of learning disorder or ADHD.  No diagnosis of chemical dependency.  No longstanding alcohol use.    PAST MEDICAL HISTORY:  No history of closed-head trauma, seizures or past major medical problems.  He  did have an ED visit 07/03/2021 for complaint of back pain, 06/29/2021 for chest pain, and 06/08/2021 for respiratory infection.    ALLERGIES:  NO KNOWN DRUG ALLERGIES.    VITAL SIGNS:  Temperature 98, /96, pulse 109, respiratory rate 20, saturation 98% on room air, weight 101 kilograms.    FAMILY HISTORY:  Negative for known mental illness, chemical dependency or suicide.    SOCIAL HISTORY:  The patient is unmarried, has no children, lives with his father in [place].  No known legal problems and no history of abuse.  He has a high school education. His longterm level of functioning and employment is unclear.    MENTAL STATUS EXAMINATION:  The patient is a tall, well-nourished, -American 25-year-old.  He is minimally responsive verbally.  He did tell me his name.  He appears disoriented.  Psychomotor retarded.  He will squeeze hand on command, change gaze direction at request, but needs repetition. He had earlier on 07/10/2021 appeared anxious to staff.  He asked his father not to leave him alone at that time.  He has been minimally able to take p.o.  He will hold positions in bed and in his chair for long periods of time.  No agitation, tic or tremor noted.  He had reported hallucinations.  Would not deny he does see things to me.  He does appear confused.  Insight and judgment impaired.  He is not able to consent for treatment currently.  He would not indicate depressed mood. No suicidal statements or admissions. No physical aggression or threats to others.     IMPRESSION:  No significant change from yesterday.  He appears to have catatonic symptoms.  Neurological workup thus far has been negative, although EEG test result is pending.  No evidence of infectious etiology thus far.  He has been drinking alcohol and is unclear if he also had some ingestion of illicit substance.  On 07/08/2021, his urine tox screen was negative, which would not rule out prior/earlier or drug use, but again, no ongoing  history per family. No acute precipitant or stressor know or history of depression. 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  This can be maximized as tolerated.  No clear evidence of preceding depression, so appears more doubtful an acute mood episode.  His family would need to provide more detailed information regarding his prior function as well as any presence of long-term eccentric behavior.  Reconsult Psychiatry as needed this week.  If EEG negative, and Neurology does not recommend further neurological testing, he would then be considered for Psychiatric admission if he does not progressively improve.  could if improved provide resources for mental health follow-up.     Jess Muir MD        D: 2021   T: 2021   MT: [facility]    Name:     NICKY ALMAGUER  MRN:      [id number]        Account:      [id number]   :      1995           Consult Date: 07/10/2021     Document: [id number]    cc:  Lee Pineda MD

## 2021-07-11 NOTE — PLAN OF CARE
0325-0525  Pt unable to communicate needs. Motion limited to slight turning of head and blinking. Nonverbal throughout the shift. Parent at bedside to assist. Tachycardic, hypertensive. Hays in place, patent. Intake via syringed liquids and massaging the laryngeal prominence to stimulate swallowing. Will continue to monitor.

## 2021-07-12 LAB
POTASSIUM BLD-SCNC: 3.4 MMOL/L (ref 3.4–5.3)
POTASSIUM BLD-SCNC: 3.5 MMOL/L (ref 3.4–5.3)

## 2021-07-12 PROCEDURE — 36415 COLL VENOUS BLD VENIPUNCTURE: CPT | Performed by: INTERNAL MEDICINE

## 2021-07-12 PROCEDURE — 120N000001 HC R&B MED SURG/OB

## 2021-07-12 PROCEDURE — 99232 SBSQ HOSP IP/OBS MODERATE 35: CPT | Performed by: INTERNAL MEDICINE

## 2021-07-12 PROCEDURE — 250N000013 HC RX MED GY IP 250 OP 250 PS 637: Performed by: INTERNAL MEDICINE

## 2021-07-12 PROCEDURE — 84132 ASSAY OF SERUM POTASSIUM: CPT | Performed by: INTERNAL MEDICINE

## 2021-07-12 PROCEDURE — 250N000011 HC RX IP 250 OP 636: Performed by: INTERNAL MEDICINE

## 2021-07-12 PROCEDURE — 99231 SBSQ HOSP IP/OBS SF/LOW 25: CPT | Performed by: NURSE PRACTITIONER

## 2021-07-12 RX ORDER — POTASSIUM CHLORIDE 7.45 MG/ML
10 INJECTION INTRAVENOUS
Status: COMPLETED | OUTPATIENT
Start: 2021-07-12 | End: 2021-07-12

## 2021-07-12 RX ADMIN — POTASSIUM CHLORIDE 10 MEQ: 7.46 INJECTION, SOLUTION INTRAVENOUS at 08:47

## 2021-07-12 RX ADMIN — POTASSIUM CHLORIDE 10 MEQ: 7.46 INJECTION, SOLUTION INTRAVENOUS at 00:04

## 2021-07-12 RX ADMIN — POTASSIUM CHLORIDE 10 MEQ: 7.46 INJECTION, SOLUTION INTRAVENOUS at 05:32

## 2021-07-12 RX ADMIN — POLYETHYLENE GLYCOL 3350 17 G: 17 POWDER, FOR SOLUTION ORAL at 12:30

## 2021-07-12 RX ADMIN — POTASSIUM CHLORIDE 10 MEQ: 7.46 INJECTION, SOLUTION INTRAVENOUS at 04:36

## 2021-07-12 RX ADMIN — DOCUSATE SODIUM AND SENNOSIDES 2 TABLET: 8.6; 5 TABLET, FILM COATED ORAL at 12:30

## 2021-07-12 RX ADMIN — POTASSIUM CHLORIDE 10 MEQ: 7.46 INJECTION, SOLUTION INTRAVENOUS at 06:37

## 2021-07-12 RX ADMIN — DOCUSATE SODIUM AND SENNOSIDES 1 TABLET: 8.6; 5 TABLET, FILM COATED ORAL at 20:38

## 2021-07-12 RX ADMIN — ACETAMINOPHEN 650 MG: 325 TABLET, FILM COATED ORAL at 06:01

## 2021-07-12 ASSESSMENT — ACTIVITIES OF DAILY LIVING (ADL)
ADLS_ACUITY_SCORE: 20

## 2021-07-12 NOTE — PROGRESS NOTES
CM/LORRAINE     D: Call received this morning from admissions specialist from [facility] informing that after review it has been determined that pt does not meet criteria for admission there. In review of chart it is noted by SW that this was recommended by PT from assessment yesterday. SW following noting possibility of transfer to in-pt MH.. for acceptance pt will need to be independent with ambulation and ADLs.     A/P: Will await further MD determination of plan of care, and discharge plan... contact in-pt MH intake for referral, or if improved provide resources for mental health follow-up per psych recommendation.         .    KARI Cottrell   Inpatient Care Coordination   [facility]     [phone]

## 2021-07-12 NOTE — PLAN OF CARE
"BP (!) 145/95 (BP Location: Left arm)   Pulse 121   Temp 98.2  F (36.8  C) (Temporal)   Resp 18   Ht 1.727 m (5' 8\")   Wt 101.7 kg (224 lb 1.6 oz)   SpO2 97%   BMI 34.07 kg/m    Orientation: More responsive today than yesterday, still very slow to respond, but if given time he will answer \"yes/no\" and simple questions. Family and pt felt unsure about taking the seroquel, they said, \"Mahnaz just started talking again and they feel that the seroquel will mask this and he will stop talking again, they feel that this is not a psych problem but something else.\" Tried to educate what I know in regards to the benefits of seroquel but still not wanting to try at this time.  Resp: LS Clear, RA, still shallow breathing at times.  Cardio: still remains tachycardic  Pain: back pain managed with Tylenol  CMS: Intact, c/o numbness to hands and feet.  Activity: Sat up in the chair for part of the shift, every half hour standing and walking in place, taking steps with assistance and simple ques. Pt was able to walk back to bed with ques and A2 gb/walker.  Diet: took a few bites of oatmeal, applesauce and 1/2 of an ensure. He is able to swallow food and pills on his own but still very slow.  Voiding: Hays cath patent  Discharge Plan: TBD     "

## 2021-07-12 NOTE — CONSULTS
Psychiatry Consultation; Follow up            Reason for Consult, requesting source:    Catatonic state  Requesting source: Senthil Vides     This visit was conducted via televideo conference using the BATS system. Patient is in hospital room, seated in chair. Writer at [facility] in private office. Patient unable to consent to video visit due to altered mental status.    Start time: 1502  End time: 1514         Interim history:    Mr. Hamilton Basurto is a 25 year old male who was admitted to [facility] on 7/8/21 after presenting to the hospital with family in [facility] of weakness, fatigue, difficulty sleeping, and not communicating. No significant past medical history. No significant past psychiatric history. Patient had apparently been drinking more alcohol recently. Pt had apparently not gotten out of bed and was hardly eating or drinking for 3-4 days prior to admission.     On interview today, patient was seen seated in a chair with a blanket over his body. He had apparently been sleeping soundly prior to my interview. Patient's brother was present in the room and provided history as patient was not responding. Brother reports that prior to patient's nap today, he appeared to almost be back to baseline. He apparently was communicating appropriately and was able to eat some soup. Brother reports that his symptoms of paranoia have improved. Apparently, over the weekend, patient was worried that the nursing staff was talking about him. Brother says he did not notice any of this today. Attempted to engage patient in conversation, but he simply stared at the ipad screen and did not respond. He looked over at his brother when I asked him how he is feeling. Affect is flat with minimal range. Unclear how he is sleeping at night.          Medications:     Current Facility-Administered Medications   Medication     acetaminophen (TYLENOL) tablet 650 mg     glucose gel 15-30 g    Or  "    dextrose 50 % injection 25-50 mL    Or     glucagon injection 1 mg     lidocaine (LMX4) cream     lidocaine 1 % 0.1-1 mL     LORazepam (ATIVAN) tablet 1 mg     melatonin tablet 1 mg     ondansetron (ZOFRAN-ODT) ODT tab 4 mg    Or     ondansetron (ZOFRAN) injection 4 mg     polyethylene glycol (MIRALAX) Packet 17 g     polyethylene glycol (MIRALAX) Packet 17 g     QUEtiapine (SEROquel) tablet 25 mg     QUEtiapine (SEROquel) tablet 25 mg     senna-docusate (SENOKOT-S/PERICOLACE) 8.6-50 MG per tablet 1 tablet    Or     senna-docusate (SENOKOT-S/PERICOLACE) 8.6-50 MG per tablet 2 tablet     sodium chloride (PF) 0.9% PF flush 3 mL     sodium chloride (PF) 0.9% PF flush 3 mL              MSE:     Appearance: age-appearing, wearing glasses, seated in chair, with blanket over body, appropriate hygiene, in no acute distress  Behavior: withdrawn, not responding to questions  Orientation: lethargic and unable to assess orientation  Movements: did not observe any tics, tremors, or dystonia; appears psychomotor retarded  Mood: JAMILAH  Affect: flat, restricted  Speech: did not speak  Memory: JAMILAH  Intellectual capacity: JAMILAH  Concentration: JAMILAH  Thought process and content: JAMILAH. Has c/o hallucinations earlier in his stay, and has demonstrated symptoms of paranoia  Insight: impaired  Judgement: impaired  Safety: has not been combative or demonstrating any self-injurious behavior      Vital signs:  Temp: 98.2  F (36.8  C) Temp src: Temporal BP: (!) 157/89 Pulse: 109   Resp: 18 SpO2: 98 % O2 Device: None (Room air)   Height: 172.7 cm (5' 8\") Weight: 101.7 kg (224 lb 1.6 oz)  Estimated body mass index is 34.07 kg/m  as calculated from the following:    Height as of this encounter: 1.727 m (5' 8\").    Weight as of this encounter: 101.7 kg (224 lb 1.6 oz).              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 Seen by Dr. Chester over the weekend. His presentation certainly does appear consistent with catatonia. His brother was present today and says that patient was responsive and acting like himself earlier today. He took a nap and was sleeping just before I saw him. On my interview, he was not answering any questions, not responding. He did appear to track this writer on the ipad, and also would look up at his brother from time to time. He otherwise did not respond to any questions. Unfortunately, I am unable to do a physical examination to look for negativism, waxy flexibility, etc. He certainly is demonstrating autonomic instability - BP is 156/96, last pulse was 131. He is afebrile. He also demonstrates staring, mutism, hypokinesis.      To confirm the diagnosis of catatonia, we could give him an Ativan trial - 1 mg IV once. If he becomes more alert and responsive after this dose, would increase to 2 mg every 6 hours and up-titrate from there. I spoke to his brother about an Ativan challenge but apparently the family is reluctant to give him any psychotropic medications. He may ultimately end up needing ECT if he does not improve, although some people do improve with benzodiazepines alone. Would continue to attempt to educate family on catatonia and the recommended treatment with Ativan.          Summary of Recommendations:   1) I will discontinue the Seroquel and would recommend to focus on encouraging the family to allow us to administer lorazepam. Would attempt an Ativan challenge if they allow - give lorazepam IV 1 mg x once. If he becomes more responsive after this, would schedule lorazepam IV 2 mg q6h. The dose can be adjusted based on response. Some people require doses in the range of 15 mg to 20 mg daily to adequately manage symptoms. Ultimately, many people require ECT  to effectively treat catatonia.     2) We will likely need to follow-up in the next day or two. Please re-consult psychiatry.       MICHAEL Pickett, CNP  [facility]   Contact information available via [facility] Paging/Directory

## 2021-07-12 NOTE — PLAN OF CARE
Pt very somnolent at the beginning of the shift, would not talk nor follow commands. This am, pt is able to answer yes/no, told me good morning and is following commands. Pt remains hypertensive. Pt and father did refuse the seroquel last evening, wanting to speak to the doctor again. Good urine output via jansen. Father at the bedside all night. Potassium being replaced, next draw at 1000.

## 2021-07-12 NOTE — PLAN OF CARE
Pt disorientated to situation only. Alert and responsive to questions today. Slow to respond. Pt becomes more catatonic after awaking from sleep but then gradually becomes more alert. VS stable; afebrile. Denies pain. CMS intact. Pt was Ax2, using walker this AM but pt is up w/ SBA, using gait belt only this evening. Saúl pulled this AM-pt has voided. Had BM. Tolerating regular diet. Will continue to monitor.

## 2021-07-12 NOTE — PROGRESS NOTES
[facility]  Hospitalist Progress Note    Assessment & Plan   Summary of Stay: Hamilton Mitchell is a 25 year old male, without significant past medical history who presents to the emergency room with progressive decline including generalized weakness, fatigue, difficulty sleeping, decreased oral intake, family reported minimal communication for the last 4 weeks and admitted to the hospital.      Subacute encephalopathy with catatonic symptoms: TSH 2.04. Vitamin B12 267. Ammonia 15.  CRP 16.  MRI of the brain did not show an acute intracranial process.  EEG unremarkable.  7/10 LP completed.  Meningitis PCR negative.  HSV PCR negative. 7/11 EEG without acute abn.   -Neurology and psychiatry consulted --neurology does not suspect neurological etiology.  Concerning for psychiatric association for somatic symptoms.  -Ativan as needed.  Seroquel 25 mg BID prn. Family is reluctant to have patient on antipsychotic medication occasions and has additional questions.  Given neurology diagnosis of likely psychiatric etiology and family concerned about medications will request psychiatry to return and evaluate patient today.  -Follow cultures to completion    Leukocytosis, mild: No clear source of infection suspected to be stress-induced.  Resolved.  Monitor.    Hyperbilirubinemia: Prior laboratory evaluation indicates prior episodes of elevated bilirubin.  Suspect possible Gilbert's disease.    Poor oral intake/dehydration: Improvement in oral intake.  Discontinue IV fluids 7/12    Generalized weakness and deconditioning: Limited mobility given baseline weakness from lack of mobility and catatonic state.  Slowly making progress.    Obesity BMI 34: Complicates cares    Acute urinary retention: Hays placed during hospitalization for urinary retention.  Discontinue Hays 7/12.  Monitor urine output.    FEN: Oral hydration, monitor-potassium replacement protocol, regular  Activity: Physical therapy  DVT  Prophylaxis: Pneumatic Compression Devices  Family update: Yes, father at bedside    Code Status: Full Code    Expected discharge: 2-3 days recommended to TBD (inpatient psych) once safe disposition plan/ TCU bed available and mobility improvement.    Radha Luciano DO    Text Page (7am - 6pm, M-F)    Interval History   Slow improvement in cognitive abilities.  Answering questions.  Recognizes he is hospitalized and recognizes person.  No abdominal pain or bowel movement.  Delayed recall and answering questions.  Does report a stressful trigger but history is unreliable.  No chest pain or palpitations.  No nausea or vomiting.  Improvement in oral intake.  Discussed with nursing.    -Data reviewed today: I reviewed all new labs and imaging results over the last 24 hours. I personally reviewed  Physical Exam   Temp: 98.2  F (36.8  C) Temp src: Temporal BP: (!) 157/89 Pulse: 109   Resp: 18 SpO2: 98 % O2 Device: None (Room air)    Vitals:    07/09/21 0904   Weight: 101.7 kg (224 lb 1.6 oz)     Vital Signs with Ranges  Temp:  [97.9  F (36.6  C)-98.2  F (36.8  C)] 98.2  F (36.8  C)  Pulse:  [109-127] 109  Resp:  [18-22] 18  BP: (145-179)/() 157/89  SpO2:  [97 %-98 %] 98 %  I/O last 3 completed shifts:  In: 870 [P.O.:870]  Out: 1600 [Urine:1600]    Constitutional: Awake, alert, cooperative with delayed cognitive recall, minimal short answers, no apparent distress. Non-toxic. Appears stated age.   HEENT: Atraumatic. Normocephalic. Conjunctiva non-injected. Sclera anicteric. MMM. No erythema or exudates of posterior oral pharynx.   Respiratory: Moves air bilaterally. Clear to auscultation bilaterally, no crackles or wheezing  Cardiovascular: Regular rate and rhythm, normal S1 and S2, and no murmur noted  GI: Normal bowel sounds, soft, non-distended, non-tender  Skin/Integumen: No rashes, no cyanosis, no edema  Neurological: Delay purposeful movements with ambulation.  Oriented to place and person    Medications        polyethylene glycol  17 g Oral Daily     senna-docusate  1 tablet Oral BID    Or     senna-docusate  2 tablet Oral BID     sodium chloride (PF)  3 mL Intracatheter Q8H     Data   Recent Labs   Lab 07/12/21  1235 07/12/21  0246 07/11/21  1701 07/11/21  1559 07/09/21  0852 07/08/21  1746   WBC  --   --   --   --  11.1* 12.3*   HGB  --   --   --   --  13.8 15.0   MCV  --   --   --   --  93 93   PLT  --   --   --   --  334 395   NA  --   --   --  139 143 140   POTASSIUM 3.5 3.4  --  3.2* 3.5 3.3*   CHLORIDE  --   --   --  106 110* 104   CO2  --   --   --  27 24 26   BUN  --   --   --  9 10 18   CR  --   --   --  0.66 0.68 0.96   ANIONGAP  --   --   --  6 9 10   KUNAL  --   --   --  9.6 9.3 9.9   GLC  --   --  125* 140* 92 104*   ALBUMIN  --   --   --  3.9  --  4.5   PROTTOTAL  --   --   --  8.4  --  9.0*   BILITOTAL  --   --   --  1.5*  --  1.9*   ALKPHOS  --   --   --  68  --  78   ALT  --   --   --  38  --  44   AST  --   --   --  23  --  20   LIPASE  --   --   --   --   --  59*     No results found for this or any previous visit (from the past 24 hour(s)).

## 2021-07-13 ENCOUNTER — TELEPHONE (OUTPATIENT)
Dept: BEHAVIORAL HEALTH | Facility: CLINIC | Age: 26
End: 2021-07-13

## 2021-07-13 VITALS
RESPIRATION RATE: 24 BRPM | OXYGEN SATURATION: 96 % | HEART RATE: 114 BPM | BODY MASS INDEX: 33.96 KG/M2 | SYSTOLIC BLOOD PRESSURE: 152 MMHG | DIASTOLIC BLOOD PRESSURE: 87 MMHG | HEIGHT: 68 IN | TEMPERATURE: 98.1 F | WEIGHT: 224.1 LBS

## 2021-07-13 LAB
ALB CSF/SERPL: 7 RATIO (ref 0–9)
ALBUMIN CSF-MCNC: 29 MG/DL (ref 0–35)
ALBUMIN SERPL-MCNC: 3.8 G/DL (ref 3.4–5)
ALBUMIN SERPL-MCNC: 4163 MG/DL (ref 3500–5200)
ALP SERPL-CCNC: 67 U/L (ref 40–150)
ALT SERPL W P-5'-P-CCNC: 41 U/L (ref 0–70)
ANION GAP SERPL CALCULATED.3IONS-SCNC: 7 MMOL/L (ref 3–14)
AST SERPL W P-5'-P-CCNC: 27 U/L (ref 0–45)
BILIRUB SERPL-MCNC: 1.2 MG/DL (ref 0.2–1.3)
BUN SERPL-MCNC: 8 MG/DL (ref 7–30)
CALCIUM SERPL-MCNC: 9.5 MG/DL (ref 8.5–10.1)
CHLORIDE BLD-SCNC: 105 MMOL/L (ref 94–109)
CO2 SERPL-SCNC: 26 MMOL/L (ref 20–32)
CREAT SERPL-MCNC: 0.69 MG/DL (ref 0.66–1.25)
D DIMER PPP FEU-MCNC: 0.38 UG/ML FEU (ref 0–0.5)
ERYTHROCYTE [DISTWIDTH] IN BLOOD BY AUTOMATED COUNT: 12.3 % (ref 10–15)
GFR SERPL CREATININE-BSD FRML MDRD: >90 ML/MIN/1.73M2
GLUCOSE BLD-MCNC: 96 MG/DL (ref 70–99)
HCT VFR BLD AUTO: 43.3 % (ref 40–53)
HGB BLD-MCNC: 14.3 G/DL (ref 13.3–17.7)
IGG CSF-MCNC: 4.2 MG/DL (ref 0–6)
IGG SERPL-MCNC: 1187 MG/DL (ref 768–1632)
IGG SYNTH RATE SER+CSF CALC-MRATE: <0 MG/D
IGG/ALB CLEAR SER+CSF-RTO: 0.51 RATIO (ref 0.28–0.66)
IGG/ALB CSF: 0.14 RATIO (ref 0.09–0.25)
MAGNESIUM SERPL-MCNC: 2.1 MG/DL (ref 1.6–2.3)
MCH RBC QN AUTO: 29.9 PG (ref 26.5–33)
MCHC RBC AUTO-ENTMCNC: 33 G/DL (ref 31.5–36.5)
MCV RBC AUTO: 91 FL (ref 78–100)
OLIGOCLONAL BANDS CSF ELPH-IMP: NEGATIVE
OLIGOCLONAL BANDS CSF ELPH-IMP: NORMAL
OLIGOCLONAL BANDS CSF IEF: 0 BANDS (ref 0–1)
PHOSPHATE SERPL-MCNC: 4 MG/DL (ref 2.5–4.5)
PLATELET # BLD AUTO: 364 10E3/UL (ref 150–450)
POTASSIUM BLD-SCNC: 3.5 MMOL/L (ref 3.4–5.3)
PROT SERPL-MCNC: 7.8 G/DL (ref 6.8–8.8)
RBC # BLD AUTO: 4.78 10E6/UL (ref 4.4–5.9)
SODIUM SERPL-SCNC: 138 MMOL/L (ref 133–144)
WBC # BLD AUTO: 13.5 10E3/UL (ref 4–11)

## 2021-07-13 PROCEDURE — 85027 COMPLETE CBC AUTOMATED: CPT | Performed by: INTERNAL MEDICINE

## 2021-07-13 PROCEDURE — 85379 FIBRIN DEGRADATION QUANT: CPT | Performed by: INTERNAL MEDICINE

## 2021-07-13 PROCEDURE — 36415 COLL VENOUS BLD VENIPUNCTURE: CPT | Performed by: INTERNAL MEDICINE

## 2021-07-13 PROCEDURE — 99232 SBSQ HOSP IP/OBS MODERATE 35: CPT | Mod: 95 | Performed by: NURSE PRACTITIONER

## 2021-07-13 PROCEDURE — 83735 ASSAY OF MAGNESIUM: CPT | Performed by: INTERNAL MEDICINE

## 2021-07-13 PROCEDURE — 99239 HOSP IP/OBS DSCHRG MGMT >30: CPT | Performed by: INTERNAL MEDICINE

## 2021-07-13 PROCEDURE — 84100 ASSAY OF PHOSPHORUS: CPT | Performed by: INTERNAL MEDICINE

## 2021-07-13 PROCEDURE — 80053 COMPREHEN METABOLIC PANEL: CPT | Performed by: INTERNAL MEDICINE

## 2021-07-13 ASSESSMENT — ACTIVITIES OF DAILY LIVING (ADL)
ADLS_ACUITY_SCORE: 20

## 2021-07-13 NOTE — DISCHARGE SUMMARY
[facility]  Discharge Summary Hospitalist    Date of Admission:  7/8/2021  Date of Discharge:  7/13/2021  Discharging Provider: Radha Luciano DO  Date of Service (when I saw the patient): 07/13/21    Discharge Diagnoses   Catatonia  Subacute encephalopathy  Hyperbilirubinemia  Acute urinary retention  Obesity BMI 34  Hypertension     History of Present Illness   Hamilton Mitchell is a 25 year old male, without significant past medical history who presents to the emergency room with progressive decline including generalized weakness, fatigue, difficulty sleeping, decreased oral intake, family reported minimal communication for the last 4 weeks and admitted to the hospital.      Hospital Course   Hamilton Mitchell was admitted on 7/8/2021.  The following problems were addressed during his hospitalization:    Subacute encephalopathy with psychosomatic catatonic symptoms: Neurology consulted. EEG unremarkable. 7/9 MRI brain without acute abn. 7/10 LP completed. Meningitis PCR negative. HSV 1/2 negative. Laboratory workup normal. Psychiatry consulted and recommended ativan and Seroquel. Family concerned about patient being on psychiatric medications. Patient slowly made improvement. Initially inpatient psych treatment was recommended but given acute improvement psychiatry recommended outpatient treatment. Consult placed and family agreeable to assist with home needs. At time of discharge jansen removed and patient urinating well. Tolerating diet. Independent in ADLs.     Mild leucocytosis: Afebrile. No focal signs of infection. WBC elevated. Monitor symptoms and have repeat labs with PCP. Suspect a component of stress induced.     Hyperbilirubinemia: Prior laboratory evaluation indicates prior episodes of elevated bilirubin.  Suspect possible Gilbert's disease. Outpatient follow-up    Hypertension: BP persistently elevated. No medication started. Follow-up with PCP for BP check.     Radha Luciano,  DO    Significant Results and Procedures   See below labs  7/11 EEG  7/10 LP    Pending Results   These results will be followed up by PCP  Unresulted Labs Ordered in the Past 30 Days of this Admission     Date and Time Order Name Status Description    7/10/2021  8:43 AM Cytomegalovirus Quant PCR Non Blood Tube 3 In process     7/10/2021 12:01 AM Paraneoplastic antibody In process     7/9/2021  3:08 PM [place] virus RNA by PCR CSF Tube 3 In process     7/9/2021  3:08 PM Oligoclonal banding CSF Tube 3 and Blood In process     7/9/2021  3:08 PM CSF Culture Aerobic Bacterial Tube 2 Preliminary         Code Status   Full Code       Primary Care Physician   [facility]    Physical Exam   Temp: 98.1  F (36.7  C) Temp src: Oral BP: (!) 152/87 Pulse: 114   Resp: 24 SpO2: 96 % O2 Device: None (Room air)    Vitals:    07/09/21 0904   Weight: 101.7 kg (224 lb 1.6 oz)     Vital Signs with Ranges  Temp:  [97  F (36.1  C)-98.1  F (36.7  C)] 98.1  F (36.7  C)  Pulse:  [103-131] 114  Resp:  [18-24] 24  BP: (144-185)/() 152/87  SpO2:  [96 %-97 %] 96 %  I/O last 3 completed shifts:  In: 340 [P.O.:340]  Out: 350 [Urine:350]    Constitutional: Awake, alert, cooperative, flat affect, delayed responsiveness, no apparent distress. Non-toxic. Appears stated age.   HEENT: Atraumatic. Normocephalic. Conjunctiva non-injected. Sclera anicteric. MMM.   Respiratory: Moves air bilaterally. Clear to auscultation bilaterally, no crackles or wheezing  Cardiovascular: Regular rate and rhythm, normal S1 and S2, and no murmur noted  GI: Normal bowel sounds, soft, non-distended, non-tender  Skin/Integumen: No rashes, no cyanosis, no edema    Discharge Disposition   Discharged to home with family   Condition at discharge: Stable    Consultations This Hospital Stay   NEUROLOGY IP CONSULT  PHYSICAL THERAPY ADULT IP CONSULT  PSYCHIATRY IP CONSULT  CARE MANAGEMENT / SOCIAL WORK IP CONSULT  PSYCHIATRY IP CONSULT  PSYCHIATRY IP  CONSULT    Time Spent on this Encounter   IRadha DO, personally saw the patient today and spent greater than 30 minutes discharging this patient.    Discharge Orders      MENTAL HEALTH REFERRAL  - Adult; Outpatient Treatment; Individual/Couples/Family/Group Therapy/Health Psychology; Other: [facility] [phone]; We will contact you to schedule the appointment or please call with any questions      Reason for your hospital stay    Psychosomatic catatonia     Follow-up and recommended labs and tests     Follow up with primary care provider, [facility], within 7 days for hospital follow- up.  No follow up labs or test are needed.  Follow up with outpatient mental health provider as scheduled     Activity    Your activity upon discharge: activity as tolerated. Continue to have assistance from family.     Monitor and record    Your blood pressure was elevated during your hospitalization. Have your primary care provider monitor your blood pressure on follow-up     When to contact your care team    Call your primary doctor if you have any of the following: temperature greater than 100.4,  increased shortness of breath, increased drainage, increased swelling, or increased pain. If you become catatonic again family should notify your mental health provider or return to the hospital.     Diet    Follow this diet upon discharge: Orders Placed This Encounter      Snacks/Supplements Adult: Ensure Enlive; Between Meals      Combination Diet Regular Diet Adult     Discharge Medications   Current Discharge Medication List      CONTINUE these medications which have NOT CHANGED    Details   polyethylene glycol (MIRALAX) 17 GM/Dose powder Take 1 capful by mouth daily           Allergies   No Known Allergies  Data   Most Recent 3 CBC's:Recent Labs   Lab Test 07/13/21  0820 07/09/21  0852 07/08/21  1746   WBC 13.5* 11.1* 12.3*   HGB 14.3 13.8 15.0   MCV 91 93 93    334 395      Most Recent  3 BMP's:  Recent Labs   Lab Test 07/13/21  0820 07/12/21  1235 07/12/21  0246 07/11/21  1701 07/11/21  1559 07/09/21  0852     --   --   --  139 143   POTASSIUM 3.5 3.5 3.4  --  3.2* 3.5   CHLORIDE 105  --   --   --  106 110*   CO2 26  --   --   --  27 24   BUN 8  --   --   --  9 10   CR 0.69  --   --   --  0.66 0.68   ANIONGAP 7  --   --   --  6 9   KUNAL 9.5  --   --   --  9.6 9.3   GLC 96  --   --  125* 140* 92     Most Recent 2 LFT's:  Recent Labs   Lab Test 07/13/21  0820 07/11/21  1559   AST 27 23   ALT 41 38   ALKPHOS 67 68   BILITOTAL 1.2 1.5*     Most Recent INR's and Anticoagulation Dosing History:  Anticoagulation Dose History    There is no flowsheet data to display.       Most Recent 3 Troponin's:No lab results found.  Most Recent Cholesterol Panel:No lab results found.  Most Recent 6 Bacteria Isolates From Any Culture (See EPIC Reports for Culture Details):  Recent Labs   Lab Test 07/10/21  1030   CULT Culture negative monitoring continues     Most Recent TSH, T4 and A1c Labs:  Recent Labs   Lab Test 07/08/21  1746   TSH 2.04     Results for orders placed or performed during the hospital encounter of 07/08/21   CT Head w/o Contrast    Narrative    EXAM: CT HEAD W/O CONTRAST  LOCATION: [facility]  DATE/TIME: 7/8/2021 6:25 PM    INDICATION: Mental status change, unknown cause  COMPARISON: None.  TECHNIQUE: Routine CT Head without IV contrast. Multiplanar reformats. Dose reduction techniques were used.    FINDINGS:  INTRACRANIAL CONTENTS: No intracranial hemorrhage, extraaxial collection, or mass effect.  No CT evidence of acute infarct. Normal parenchymal attenuation. Normal ventricles and sulci.     VISUALIZED ORBITS/SINUSES/MASTOIDS: No intraorbital abnormality. No paranasal sinus mucosal disease. No middle ear or mastoid effusion.    BONES/SOFT TISSUES: No acute abnormality.      Impression    IMPRESSION:  1.  No CT finding of a mass, hemorrhage or focal area suggestive of acute  infarct.   XR Chest 2 Views    Narrative    EXAM: XR CHEST 2 VW  LOCATION: [facility]  DATE/TIME: 7/8/2021 6:48 PM    INDICATION: Weakness  COMPARISON: None.      Impression    IMPRESSION: Low lung volumes. Heart size and pulmonary vascularity normal. The lungs are clear.   CT Abdomen Pelvis w Contrast    Narrative    EXAM: CT ABDOMEN PELVIS W CONTRAST  LOCATION: [facility]  DATE/TIME: 7/8/2021 6:24 PM    INDICATION: Abdominal  COMPARISON: None.  TECHNIQUE: CT scan of the abdomen and pelvis was performed following injection of IV contrast. Multiplanar reformats were obtained. Dose reduction techniques were used.  CONTRAST: 100 mL Isovue-370    FINDINGS:   LOWER CHEST: Normal.    HEPATOBILIARY: Diffuse fatty infiltration of liver. Mild motion artifact.    PANCREAS: Normal.    SPLEEN: Normal.    ADRENAL GLANDS: Normal.    KIDNEYS/BLADDER: Mild motion artifact. No stones or hydronephrosis.    BOWEL: No obstruction or inflammatory change. Appendix normal. Mild motion artifact. Fluid seen throughout the colon suggesting diarrhea but no inflammatory wall thickening.    LYMPH NODES: Normal.    VASCULATURE: Unremarkable.    PELVIC ORGANS: Normal.    MUSCULOSKELETAL: Normal.      Impression    IMPRESSION:   1.  There is fluid seen throughout the colon raising question of diarrhea but there is no inflammatory changes of bowel wall.  2.  Mild fatty infiltration of liver.   MR Brain w/o & w Contrast    Narrative    MRI BRAIN WITHOUT AND WITH CONTRAST  7/9/2021 10:58 AM     HISTORY:  Mental status change, unknown cause.     TECHNIQUE:  Multiplanar, multisequence MRI of the brain without and  with 10 mL Gadavist.     COMPARISON: CT head 7/8/2021.     FINDINGS: There is moderate motion-related artifact on several  sequences, which somewhat limits the exam. There is no definite  abnormal intracranial restricted diffusion to suggest acute infarct.  The ventricles are normal in size and configuration. No  definite  intracranial hemorrhage, extra-axial fluid collection, or definite  mass lesion. There is no significant mass effect or shift/herniation.  No gross abnormal postcontrast enhancement is identified.    No gross abnormality of the visualized skull base, mid face or  calvarium.      Impression    IMPRESSION: Somewhat motion-limited exam without definite findings of  acute intracranial process.    BOLIVAR YAN MD         SYSTEM ID:  [id number]   XR Lumbar Puncture Spinal Tap Diag    Narrative    [facility]  INTERVENTIONAL NEURORADIOLOGY  PROCEDURAL NOTE    FLUOROSCOPICALLY-GUIDED LUMBAR PUNCTURE    INDICATION: History of altered mental status. Lumbar puncture has been  requested to obtain cerebrospinal fluid (CSF) for analysis.    CONSENT: The procedure and its indications, major risks, benefits, and  alternatives were discussed. Risks including, but not limited to,  pain, headache, hemorrhage, infection, nerve damage, spinal cord  damage, and allergic reaction were discussed. Understanding was  acknowledged, and a signed informed consent was obtained. Consent was  obtained from family due to patient incapacity.    FLUOROSCOPIC TIME: 0.1 minute (1 image)    ESTIMATED BLOOD LOSS: Minimal    PERFORMING PHYSICIAN(S):  Thomas Feng M.D.    PROCEDURE: The patient was placed in the prone position upon the  fluoroscopic table. The skin of the back was prepped and draped in  sterile fashion. Using fluoroscopic guidance, the L3-L4 level was  localized. The skin was infiltrated with 1% lidocaine. Using direct  fluoroscopic visualization and a posterior interlaminar approach, a 22  gauge spinal needle was advanced into the thecal sac at the L3-L4  level. 12 cc of clear CSF was passively obtained in total. This was  divided between 4 labeled tubes. The needle was then removed. A  dressing was applied. The procedure appeared well-tolerated. There was  no apparent complication.    CONCLUSION:     1.  Fluoroscopically-guided lumbar puncture yielding 12 cc of clear  cerebrospinal fluid, sent for analysis.    2. Opening pressure: 17 cm H20  _____________________________________________    CPT codes included for physician reference only:     04804/02230    WILLIE BROWN MD         SYSTEM ID:  [id number]

## 2021-07-13 NOTE — TELEPHONE ENCOUNTER
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    B:  Pt presented to [facility] on 7/8 with catatonia and admitted to medical floor.  Neuro  Assess and pt has no neuro Issues.  Psyc assessed and recommends IP MH placement.   Pt was presentting with progressive confusion, increased anxiety and increased confusion.  Pt has no drug ingestion and UTOX also reflects negative for substances.  PT father provided collateral info that pt has been drinking more.  Per chart review:  On 7/8  \"Pt has not been able to eat or drink well. He complains of his stomach being distended. He is also having a hard time sleeping and only gets a hour of sleep at night. He will usually wake up with anxiety. He appears to be confused at some moments and has total body weakness. He currently has back pain, ear pain, and a headache. The patient has been seen at Urgent care, [facility], and [facility] in the past month since his symptoms started.     A:  Vol    R:    Patient cleared and ready for behavioral bed placement: Yes     Provider following pt @ [facility] is Dr Radha Luciano [facility] [phone].   Unit pt is on is 6 Ortho/Spine @ [phone]  Pt placed on worklist pending bed availability @ 11:55am    "

## 2021-07-13 NOTE — PROGRESS NOTES
"Cross cover notified of patient with chest pain.  Spoke to patient's RN.  The patient has been mostly in a catatonic state and not answer any questions.  He told the patient's RN that \"he felt like his heart was missing and that it was not there.\"  Upon reevaluation when asked where the pain was he pointed to his left upper quadrant of his abdomen.  He has remained tachycardic throughout the hospitalization.  His potassium has been replaced.  Initial EKG showed sinus tachycardia.  25-year-old should have any reason for acute coronary syndrome.  Based on the tachycardia PE would be in the differential although this is the first he is mentioned any possible chest related symptom or shortness of breath.  He is not hypoxic.  He reportedly has been very sedentary though so perhaps at risk for DVT/PE.  Add D-dimer to morning labs.  If elevated consider CTPA to rule out PE.  "

## 2021-07-13 NOTE — PROGRESS NOTES
Web paged: Pt. complaining of chest pain. Has been tachy. Advice. Thanks you.  1014: ordered labs. ref. Note.

## 2021-07-13 NOTE — CONSULTS
"      Psychiatry Consultation; Follow up            Reason for Consult, requesting source:    Independent ADLs. Responsive, evaluate if still appropriate for inpatient psychiatry  Requesting source: eSnthil Vides     This visit was conducted via televideo conference using the [employer] system. Patient is in hospital room, seated in chair. Writer at [facility] in private office. Patient consents to video visit at beginning of interview.      Start time: 1320  End time: 1333         Interim history:    Mr. Hamilton Basurto is a 25 year old male who was admitted to [facility] on 7/8/21 [facility], fatigue, difficulty sleeping, and not communicating. No significant past medical history. No significant past psychiatric history. Patient had apparently been drinking more alcohol recently. Pt had apparently not gotten out of bed and was hardly eating or drinking for 3-4 days prior to admission.     Mr. Basurto is seen in his hospital room today, accompanied by his father. He reports that he is feeling \"a little better\" today. He says his \"though process\" is improved. He denies feeling confused. Reports being able to concentrate and formulate thoughts better today. Pt reports that he had been experiencing hallucinations three days ago, but denies any hallucinations today. Denies any paranoia. Reports \"I think it was heat exhaustion and lack of sleep.\" Attempted to discuss his alcohol use but he was mildly evasive, stating \"I haven't drank in awhile.\" He was unable to tell me the last time or quantify his alcohol use, states \"I don't drink anymore.\" In terms of cannabis use, reports that he had been \"smoking a blunt or two every now and then.\" Pt and father deny any family history of psychosis or mental illness. Pt reports he is sleeping better, eating, and drinking. Recommended he follow-up with a therapist to find alternative coping " "strategies, to which he was agreeable.          Medications:     Current Facility-Administered Medications   Medication     acetaminophen (TYLENOL) tablet 650 mg     glucose gel 15-30 g    Or     dextrose 50 % injection 25-50 mL    Or     glucagon injection 1 mg     lidocaine (LMX4) cream     lidocaine 1 % 0.1-1 mL     LORazepam (ATIVAN) tablet 1 mg     melatonin tablet 1 mg     ondansetron (ZOFRAN-ODT) ODT tab 4 mg    Or     ondansetron (ZOFRAN) injection 4 mg     polyethylene glycol (MIRALAX) Packet 17 g     polyethylene glycol (MIRALAX) Packet 17 g     QUEtiapine (SEROquel) tablet 25 mg     senna-docusate (SENOKOT-S/PERICOLACE) 8.6-50 MG per tablet 1 tablet    Or     senna-docusate (SENOKOT-S/PERICOLACE) 8.6-50 MG per tablet 2 tablet     sodium chloride (PF) 0.9% PF flush 3 mL     sodium chloride (PF) 0.9% PF flush 3 mL              MSE:     Appearance: age-appearing, seated in chair, wearing glasses, adequate hygiene and grooming, in no acute distress  Behavior: calm, cooperative, demonstrating appropriate eye contact  Orientation: alert and oriented to time, place and person  Movements: no abnormal or involuntary movements observed  Mood: \"better\"  Affect: mildly restricted in range, mood-congruent, stable  Speech: Normal rate, rhythm, tone  Memory: no impairment in immediate, recent, or remote memory  Intellectual capacity: Average for development based on word choice  Concentration: attentive to interview  Thought process and content: linear and organized; denies AH/VH. No delusions or paranoia endorsed or elicited. Denies AH/VH.   Insight: fair  Judgement: fair  Safety: denies suicidal or homicidal ideation       Vital signs:  Temp: 98.1  F (36.7  C) Temp src: Oral BP: (!) 152/87 Pulse: 114   Resp: 24 SpO2: 96 % O2 Device: None (Room air)   Height: 172.7 cm (5' 8\") Weight: 101.7 kg (224 lb 1.6 oz)  Estimated body mass index is 34.07 kg/m  as calculated from the following:    Height as of this encounter: " "1.727 m (5' 8\").    Weight as of this encounter: 101.7 kg (224 lb 1.6 oz).              DSM-5 Diagnosis:   Unspecified psychosis with catatonic features  r/o alcohol use disorder and withdrawal  Cannabis abuse          Assessment:   Mr. Hamilton Basurto is a 25 year old male who was [facility] to [facility], fatigue, difficulty sleeping, and not communicating. No significant past medical history. No significant past psychiatric history. Patient had apparently been drinking more alcohol recently. Pt had apparently not gotten out of bed and was hardly eating or drinking for 3-4 days prior to admission.    Mr. Basurto is appearing improved today. He has been independent in ADLs, interacting appropriately with staff and family. It is unclear to me what may have led to his symptoms of catatonia and psychosis, as well as the rapid improvement of symptoms. He is a bit evasive in regard to his use of alcohol and cannabis and I do wonder whether substances could have played a role. Appears he has been dealing with his presenting symptoms off and on for the past month and I did read a note suggesting that he had been drinking more alcohol as of late.     At this point, I do not think that his current condition warrants inpatient psychiatric hospitalization. I spoke to patient and his father about concerning symptoms (not eating, not drinking, not responding to people, not sleeping, hallucinations) to look out for and to come back to the ED should these symptoms return, as catatonia can be life-threatening if left untreated. Patient was recommended to avoid any non-prescribed mind or mood-altering substances, including cannabis and alcohol. He was agreeable to a psychotherapy referral.           Summary of Recommendations:   1) Okay to discharge home from a psychiatric perspective. Discussed with pt and his father to bring patient back " to the ED should his symptoms re-emerge.     2) Discussed to avoid any non-prescribed mind- or mood-altering substances, including alcohol and cannabis.     3) Referral made to [facility] for outpatient psychotherapy follow-up    4) Follow-up with primary care and consider obtaining a psychiatry referral from PCP    5) Thank you for this consult. Page me with additional questions or concerns.       MICHAEL Pickett, CNP  [facility]   Contact information available via [facility] Paging/Directory

## 2021-07-13 NOTE — CONSULTS
.  Care Management Initial Consult    General Information     Type of CM/SW Visit: Initial Assessment          Advance Care Planning: no issues identified  General Information Comments: per discussion with MD, pt would be medically stable for transfer to in-pt psych. Per RN, pt is independent with ambulation and ADLs.     Communication Assessment  Patient's communication style: spoken language (English or Bilingual)    Hearing Difficulty or Deaf: no   Wear Glasses or Blind: yes    Cognitive  Cognitive/Neuro/Behavioral: .WDL except  Level of Consciousness: alert  Arousal Level: opens eyes spontaneously  Orientation: oriented x 4  Mood/Behavior: calm, cooperative  Best Language: 0 - No aphasia  Speech: clear, slow    Living Environment:   People in home: parent(s)     Current living Arrangements: house             Family/Social Support:  Care provided by: self    Provides care for: no one  Marital Status: Single             Description of Support System: Involved, Supportive         Current Resources:   Patient receiving home care services: No     Equipment currently used at home: none      Employment/Financial:  Employment Status: unemployed        Financial Concerns: No concerns identified           Lifestyle & Psychosocial Needs:  Social Determinants of Health     Tobacco Use:      Smoking Tobacco Use:      Smokeless Tobacco Use:    Alcohol Use:      Frequency of Alcohol Consumption:      Average Number of Drinks:      Frequency of Binge Drinking:    Financial Resource Strain:      Difficulty of Paying Living Expenses:    Food Insecurity:      Worried About Running Out of Food in the Last Year:      Ran Out of Food in the Last Year:    Transportation Needs:      Lack of Transportation (Medical):      Lack of Transportation (Non-Medical):    Physical Activity:      Days of Exercise per Week:      Minutes of Exercise per Session:    Stress:      Feeling of Stress :    Social Connections:      Frequency of  Communication with Friends and Family:      Frequency of Social Gatherings with Friends and Family:      Attends [employer] Services:      Active Member of Clubs or Organizations:      Attends Club or Organization Meetings:      Marital Status:    Intimate Partner Violence:      Fear of Current or Ex-Partner:      Emotionally Abused:      Physically Abused:      Sexually Abused:    Depression:      PHQ-2 Score:    Housing Stability:      Unable to Pay for Housing in the Last Year:      Number of Places Lived in the Last Year:      Unstable Housing in the Last Year:                Mental Health Status:  active                    Additional Information:    Contact made this morning with Ely at [facility] intake and referral was made. She has informed that based on their current waiting list, there would be an anticipation of bed availability in 1-3 days pending discharges at the facilities. MD has been updated, pt/family will be updated also.     Plan:     Will await determination of  intake assessment and bed availability, continue planning accordingly in anticipation of transfer to in-pt MH on discharge.      .    KARI Cottrell   Inpatient Care Coordination   [facility]     [phone]     Addendum:     D: Per RN pt discharged home, [facility] has contacted  intake to inform that no in-pt MH bed needed.

## 2021-07-13 NOTE — PLAN OF CARE
Pt A&O x4-still slow to respond @ times. VS stable; afebrile. Denies pain. CMS: some tingling in BLEs. Up independently in room. Voiding in good amts. Had BM. Tolerating regular diet.     Reviewed discharge instructions and medications with patient, brother, and dad. Questions answered. Patient discharged to home via dad w/, discharge instructions, and belongings at this time.

## 2021-07-13 NOTE — PLAN OF CARE
Physical Therapy Discharge Summary    Reason for therapy discharge:    Discharged to home.    Progress towards therapy goal(s). See goals on Care Plan in [facility] electronic health record for goal details.  Goals not met.  Barriers to achieving goals:   discharge from facility.    Therapy recommendation(s):    Continued therapy is recommended.  Rationale/Recommendations:  Initially recommending ARU, however via chart review pt's mobility improved greatly over IP stay and no longer had continued physical therapy needs.    Note: Pt not seen by documenting PT on this date. Information obtained from chart review.

## 2021-07-13 NOTE — PROGRESS NOTES
Pt is more A/Ox3, though slow to respond, pt is able to express needs. SBA of one, pt had a shower. Tachy and Hypertensive. Pt complained of chest pain/left upper abdominal pain. Dr longoria- Ordered am labs, saline lock. Voiding. Father at bedside.Pt slept throughout the night. Will continue to monitor.

## 2021-07-14 ENCOUNTER — PATIENT OUTREACH (OUTPATIENT)
Dept: CARE COORDINATION | Facility: CLINIC | Age: 26
End: 2021-07-14

## 2021-07-14 DIAGNOSIS — Z71.89 OTHER SPECIFIED COUNSELING: ICD-10-CM

## 2021-07-14 NOTE — PROGRESS NOTES
Clinic Care Coordination Contact  [facility]/Voicemail       Clinical Data: Care Coordinator Outreach  Outreach attempted x 1.  Left message on patient's voicemail with call back information and requested return call.  Plan:  Care Coordinator will try to reach patient again in 1-2 business days.

## 2021-07-15 ENCOUNTER — TELEPHONE (OUTPATIENT)
Dept: FAMILY MEDICINE | Facility: CLINIC | Age: 26
End: 2021-07-15

## 2021-07-15 LAB
BACTERIA SPEC CULT: NO GROWTH
LAB SCANNED RESULT: NORMAL
Lab: NORMAL
SPECIMEN SOURCE: NORMAL

## 2021-07-15 NOTE — PROGRESS NOTES
Clinic Care Coordination Contact  [facility]/Voicemail       Clinical Data: Care Coordinator Outreach  Outreach attempted x 2.  Left message on patient's voicemail with call back information and requested return call.  Plan:Care Coordinator will do no further outreaches at this time.

## 2021-07-15 NOTE — TELEPHONE ENCOUNTER
Spoke with patient. Patient needs to make an appointment with provider regarding request for work letter. Patient will call scheduling at a later time.    Modesto Rosales RN

## 2021-07-15 NOTE — TELEPHONE ENCOUNTER
Ph. [phone]    Patient calling in regards to getting a letter for work due to being hospitalized 06/29-7/13 and still in recovery    Patient is scheduled for hospital follow up 07/23/21 but requesting letter before office visit.    Please advise    Alejandra Goldstein

## 2021-07-16 LAB
PNP ABY SERUM: NORMAL
SPECIMEN SOURCE: NORMAL
WNV RNA SER QL NAA+PROBE: NOT DETECTED

## 2021-07-22 ENCOUNTER — VIRTUAL VISIT (OUTPATIENT)
Dept: FAMILY MEDICINE | Facility: CLINIC | Age: 26
End: 2021-07-22
Payer: COMMERCIAL

## 2021-07-22 ENCOUNTER — TELEPHONE (OUTPATIENT)
Dept: FAMILY MEDICINE | Facility: CLINIC | Age: 26
End: 2021-07-22

## 2021-07-22 DIAGNOSIS — M62.81 MUSCLE WEAKNESS (GENERALIZED): Primary | ICD-10-CM

## 2021-07-22 PROCEDURE — 99213 OFFICE O/P EST LOW 20 MIN: CPT | Performed by: FAMILY MEDICINE

## 2021-07-22 NOTE — LETTER
[facility]  [address] [address] [address]  Phone: [phone]    July 22, 2021        Hamilton Mitchell  [address] [place] [address]          To whom it may concern:    RE: Hamilton Mitchell    Patient was seen and treated today and missed work.  Patient may return to work Monday 8/2/2021.    Please contact me for questions or concerns.      Sincerely,        Grupo Matthew MD

## 2021-07-22 NOTE — PROGRESS NOTES
Hamilton is a 25 year old who is being evaluated via a billable video visit.      How would you like to obtain your AVS? MyChart  If the video visit is dropped, the invitation should be resent by: Text to cell phone: [phone]  Will anyone else be joining your video visit?     Video Start Time: 2:15.    Assessment & Plan     Muscle weakness (generalized)  Pt is still struggling with muscle weakness and fatigue after his discharge, at this time, I recommend close follow up F2F for his hospital follow up, and will give letter for work, to go back to work on Monday 8/2 if he is feeling better.                     Return in about 1 week (around 7/29/2021).    Grupo Matthew MD  [facility]    Isauro Villasenor is a 25 year old who presents for the following health issues     History of Present Illness       He eats 2-3 servings of fruits and vegetables daily.He consumes 2 sweetened beverage(s) daily.He exercises with enough effort to increase his heart rate 10 to 19 minutes per day.  He exercises with enough effort to increase his heart rate 3 or less days per week.   He is taking medications regularly.       Hospital Follow-up Visit:    Hospital/Nursing Home/IP Rehab Facility: [facility]  Date of Admission: 7/8/2021  Date of Discharge: 7/13/2021  Reason(s) for Admission: subacute encephalopathy with catatonic symptoms.       Was your hospitalization related to COVID-19? No   Problems taking medications regularly:  Pt has stopped all meds given to him from the hospital as he doesn't feel comfortable taking these meds.  Medication changes since discharge: pt is not taking meds.  Problems adhering to non-medication therapy:  None was given.    Summary of hospitalization:  [facility] discharge summary reviewed  Diagnostic Tests/Treatments reviewed.  Follow up needed: with primary.  Other Healthcare Providers Involved in Patient s Care:         None  Update since  discharge: improved check below Post Discharge Medication Reconciliation: discharge medications reconciled and changed, per note/orders.  Plan of care communicated with patient and his brother.             Pt is stated wants to establish are with Dr cox. Pt stated has been in and out the hospital since June 29 and needs letter for work.    Pt was admitted to [facility] with obtundation, and catatonic symptoms, had multiple studies done that was all negative, including MR of the brain, LB, and all other labs, psychiatry were consulted and started on ativan and seroquel but pt is not taking them any more.  Pt is still feeling tired at this time, and he is unable to work any physical job at this time.       Review of Systems         Objective           Vitals:  No vitals were obtained today due to virtual visit.    Physical Exam   GENERAL: Healthy, alert and no distress  EYES: Eyes grossly normal to inspection.  No discharge or erythema, or obvious scleral/conjunctival abnormalities.  RESP: No audible wheeze, cough, or visible cyanosis.  No visible retractions or increased work of breathing.    PSYCH: Mentation appears normal, affect normal/bright, judgement and insight intact, normal speech and appearance well-groomed.                Video-Visit Details    Type of service:  Video Visit    Video End Time:2:30.    Originating Location (pt. Location): Home    Distant Location (provider location):  [facility] [place]     Platform used for Video Visit: [employer]

## 2021-07-30 ENCOUNTER — OFFICE VISIT (OUTPATIENT)
Dept: FAMILY MEDICINE | Facility: CLINIC | Age: 26
End: 2021-07-30
Payer: COMMERCIAL

## 2021-07-30 VITALS
HEART RATE: 102 BPM | WEIGHT: 225.38 LBS | BODY MASS INDEX: 34.16 KG/M2 | SYSTOLIC BLOOD PRESSURE: 137 MMHG | HEIGHT: 68 IN | DIASTOLIC BLOOD PRESSURE: 88 MMHG | OXYGEN SATURATION: 98 %

## 2021-07-30 DIAGNOSIS — Z59.9 FINANCIAL DIFFICULTIES: ICD-10-CM

## 2021-07-30 DIAGNOSIS — M62.81 MUSCLE WEAKNESS (GENERALIZED): ICD-10-CM

## 2021-07-30 DIAGNOSIS — F06.1 CATATONIA: Primary | ICD-10-CM

## 2021-07-30 PROCEDURE — 99214 OFFICE O/P EST MOD 30 MIN: CPT | Performed by: FAMILY MEDICINE

## 2021-07-30 RX ORDER — IBUPROFEN 600 MG/1
TABLET, FILM COATED ORAL
COMMUNITY
Start: 2021-07-03 | End: 2021-08-02

## 2021-07-30 SDOH — ECONOMIC STABILITY - INCOME SECURITY: PROBLEM RELATED TO HOUSING AND ECONOMIC CIRCUMSTANCES, UNSPECIFIED: Z59.9

## 2021-07-30 ASSESSMENT — MIFFLIN-ST. JEOR: SCORE: 1971.79

## 2021-07-30 NOTE — PROGRESS NOTES
Assessment & Plan     1. Catatonia  2. Muscle weakness (generalized)  3. Financial difficulties  - SARS-CoV-2 Nucleocapsid Total Ab; Future  - Care Coordination Referral; Future     Extensively reviewed chart - ED notes, H&P, psych and neuro consults, discontinue summary over the last 2 months. He was admitted with 5 days of catatonic symptoms, etiology remains unclear. Symptoms finally improving on day of discharge. With 5 days of hospitalization with catatonic symptoms and extensive work up, hospitalization seems very reasonable. Will reach out to  and discharging hospitalist. Provided number for hospital billing due to letter from insurance that hospitalization will not be covered. Recommend reaching out to hospital patient advocate as well.     Symptoms continue to improve although still seems to have a slight delay in processing. Recommend following up with mental health provider as recommended at discharge.     Brother concerned that this is a covid complication. COVID testing was negative, possibility of a false negative, will check antibody testing.    Blood pressure in pre-hypertensive range today. Continue to monitor.      Return in about 51 weeks (around 7/22/2022) for Physical Exam, sooner as needed .    Lesvia Hopper, DO  [facility]    Isauro Villasenor is a 25 year old who presents for the following health issues  accompanied by his brother:    Chief Complaints and History of Present Illnesses   Patient presents with     ER F/U       HPI   Here today for an ED follow up. Went to [facility] for an appointment. Told needed to go straight to the ED. Went to [facility], was admitted for 5 days. Couldn't poop, couldn't eat, couldn't walk. Couldn't figure out the cause. Had a catheter. All of the sudden, symptoms started to improve without any medication. Didn't find any answers.     No prior episodes. Received a letter from insurance, diagnosis  "was muscle weakness, told diagnosis isn't covered and that they won't cover the cost.     Review of Systems   Symptoms are improving, still a little weak and tired.       Objective    /88   Pulse 102   Ht 1.727 m (5' 8\")   Wt 102.2 kg (225 lb 6 oz)   SpO2 98%   BMI 34.27 kg/m    Body mass index is 34.27 kg/m .  Physical Exam   GENERAL: healthy, alert, no distress and obese  NECK: no adenopathy, no asymmetry, masses, or scars and thyroid normal to palpation  RESP: lungs clear to auscultation - no rales, rhonchi or wheezes  CV: regular rate and rhythm, normal S1 S2, no S3 or S4, no murmur, click or rub, no peripheral edema and peripheral pulses strong  ABDOMEN: soft, nontender, no hepatosplenomegaly, no masses and bowel sounds normal  MS: no gross musculoskeletal defects noted, no edema  NEURO: speech intact, no gross deficits present  PSYCH: mentation appears normal, affect flat and appearance well groomed    REVIEW OF RECORDS:  6/8/21 - ED visit, [facility], URI, reactive airway disease, elevated blood pressure, negative CXR. Negative COVID. Discharged home with albuterol inhaler.   6/11/21 -  Urgent Care, reported alcohol binge and marijuana use. Recommended f/u with primary care and behavioral health  6/29/21 - ED visit @ [facility], chest wall pain, cough, sore throat, congestion, abdominal pain. Negative COVID, negative CXR. Trial omeprazole for likely gastritis/PUD. MSK chest pain, recommended tylenol.   7/3/21 - [facility] - CC left flank pain and confusion. Recommended to go to [facility] ED for further evaluation.   7/3/21 - [facility] ED - UA normal, CT abdomen pelvis without acute process. Discharged to outpatient management.   7/8/21 - hard to talk and walk, hard to eat, hard to sleep, confused. Encephalopathic, concerned for mental health. Not mentally or neurologically well, admitted for further work up. Utox negative.   H&P: encephalopathy, leukocytosis, mild elevated " bilirubin, poor oral intake, generalized weakness.   7/9/21 - neuro consult: neuropsychiatric syndrome of decreased speech, insomnia, slow processing. Spinal tap, EEG, paraneoplastic antibody. MRI brain limited due to motion, no definite findings of acute intracranial process.  7/10/21: psych consult, neuro work up to date negative, catatonic symptoms present. 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 Schedule Seroquel and lorazapam. Consider psych admission if not improving and neuro work up negative.  7/12/21: psych follow up, agrees with acute psychotic episode with catatonia. Recommended trial of Ativan. Family reluctant. Discontinued seroquel.   7/13/21: care management consult, awaiting determination of MH intake and bed availability with plan to transfer to  Inpatient mental health on discharge.   7/13/21: psych follow up, symptoms improving. Inpatient psych no longer needed. Ok to discharge home, follow up with psychotherapy and psychiatry as needed.   7/13/21: discharged  7/15/21 - no showed hospital follow up appointment with [facility].  7/22/21: virtual visit, muscle weakness and fatigue. Recommended in person visit

## 2021-08-02 ENCOUNTER — APPOINTMENT (OUTPATIENT)
Dept: RADIOLOGY | Facility: HOSPITAL | Age: 26
End: 2021-08-02
Attending: STUDENT IN AN ORGANIZED HEALTH CARE EDUCATION/TRAINING PROGRAM
Payer: COMMERCIAL

## 2021-08-02 ENCOUNTER — TELEPHONE (OUTPATIENT)
Dept: CARE COORDINATION | Facility: CLINIC | Age: 26
End: 2021-08-02

## 2021-08-02 ENCOUNTER — PATIENT OUTREACH (OUTPATIENT)
Dept: NURSING | Facility: CLINIC | Age: 26
End: 2021-08-02
Payer: COMMERCIAL

## 2021-08-02 ENCOUNTER — HOSPITAL ENCOUNTER (EMERGENCY)
Facility: HOSPITAL | Age: 26
Discharge: HOME | End: 2021-08-02
Attending: EMERGENCY MEDICINE | Admitting: EMERGENCY MEDICINE
Payer: COMMERCIAL

## 2021-08-02 ENCOUNTER — NURSE TRIAGE (OUTPATIENT)
Dept: FAMILY MEDICINE | Facility: CLINIC | Age: 26
End: 2021-08-02

## 2021-08-02 VITALS
DIASTOLIC BLOOD PRESSURE: 88 MMHG | BODY MASS INDEX: 34.21 KG/M2 | HEART RATE: 101 BPM | RESPIRATION RATE: 16 BRPM | TEMPERATURE: 99 F | OXYGEN SATURATION: 98 % | WEIGHT: 225 LBS | SYSTOLIC BLOOD PRESSURE: 147 MMHG

## 2021-08-02 DIAGNOSIS — S80.02XA CONTUSION OF LEFT KNEE, INITIAL ENCOUNTER: ICD-10-CM

## 2021-08-02 LAB
ALBUMIN UR-MCNC: NEGATIVE MG/DL
ANION GAP SERPL CALCULATED.3IONS-SCNC: 10 MMOL/L (ref 5–18)
APPEARANCE UR: CLEAR
BACTERIA #/AREA URNS HPF: ABNORMAL /HPF
BILIRUB UR QL STRIP: NEGATIVE
BUN SERPL-MCNC: 4 MG/DL (ref 8–22)
C REACTIVE PROTEIN LHE: 1.4 MG/DL (ref 0–0.8)
CALCIUM SERPL-MCNC: 10 MG/DL (ref 8.5–10.5)
CHLORIDE BLD-SCNC: 103 MMOL/L (ref 98–107)
CO2 SERPL-SCNC: 27 MMOL/L (ref 22–31)
COLOR UR AUTO: ABNORMAL
CREAT SERPL-MCNC: 0.71 MG/DL (ref 0.7–1.3)
ERYTHROCYTE [DISTWIDTH] IN BLOOD BY AUTOMATED COUNT: 12.9 % (ref 10–15)
ERYTHROCYTE [SEDIMENTATION RATE] IN BLOOD BY WESTERGREN METHOD: 9 MM/HR (ref 0–15)
GFR SERPL CREATININE-BSD FRML MDRD: >90 ML/MIN/1.73M2
GLUCOSE BLD-MCNC: 104 MG/DL (ref 70–125)
GLUCOSE UR STRIP-MCNC: NEGATIVE MG/DL
HCT VFR BLD AUTO: 43.9 % (ref 40–53)
HGB BLD-MCNC: 14.5 G/DL (ref 13.3–17.7)
HGB UR QL STRIP: NEGATIVE
HOLD SPECIMEN: NORMAL
KETONES UR STRIP-MCNC: ABNORMAL MG/DL
LEUKOCYTE ESTERASE UR QL STRIP: NEGATIVE
MCH RBC QN AUTO: 30.1 PG (ref 26.5–33)
MCHC RBC AUTO-ENTMCNC: 33 G/DL (ref 31.5–36.5)
MCV RBC AUTO: 91 FL (ref 78–100)
MUCOUS THREADS #/AREA URNS LPF: PRESENT /LPF
NITRATE UR QL: NEGATIVE
PH UR STRIP: 5.5 [PH] (ref 5–7)
PLATELET # BLD AUTO: 409 10E3/UL (ref 150–450)
POTASSIUM BLD-SCNC: 4.1 MMOL/L (ref 3.5–5)
RBC # BLD AUTO: 4.81 10E6/UL (ref 4.4–5.9)
RBC URINE: 1 /HPF
SODIUM SERPL-SCNC: 140 MMOL/L (ref 136–145)
SP GR UR STRIP: 1.02 (ref 1–1.03)
UROBILINOGEN UR STRIP-MCNC: <2 MG/DL
WBC # BLD AUTO: 11.6 10E3/UL (ref 4–11)
WBC URINE: 2 /HPF

## 2021-08-02 PROCEDURE — 85027 COMPLETE CBC AUTOMATED: CPT | Performed by: PHYSICIAN ASSISTANT

## 2021-08-02 PROCEDURE — 85652 RBC SED RATE AUTOMATED: CPT | Performed by: EMERGENCY MEDICINE

## 2021-08-02 PROCEDURE — 250N000013 HC RX MED GY IP 250 OP 250 PS 637: Performed by: EMERGENCY MEDICINE

## 2021-08-02 PROCEDURE — 80048 BASIC METABOLIC PNL TOTAL CA: CPT | Performed by: PHYSICIAN ASSISTANT

## 2021-08-02 PROCEDURE — 99284 EMERGENCY DEPT VISIT MOD MDM: CPT

## 2021-08-02 PROCEDURE — 81003 URINALYSIS AUTO W/O SCOPE: CPT | Performed by: EMERGENCY MEDICINE

## 2021-08-02 PROCEDURE — 73562 X-RAY EXAM OF KNEE 3: CPT | Mod: LT

## 2021-08-02 PROCEDURE — 86141 C-REACTIVE PROTEIN HS: CPT | Performed by: EMERGENCY MEDICINE

## 2021-08-02 PROCEDURE — 36415 COLL VENOUS BLD VENIPUNCTURE: CPT | Performed by: PHYSICIAN ASSISTANT

## 2021-08-02 RX ORDER — IBUPROFEN 600 MG/1
600 TABLET, FILM COATED ORAL EVERY 6 HOURS PRN
Qty: 20 TABLET | Refills: 0 | Status: SHIPPED | OUTPATIENT
Start: 2021-08-02

## 2021-08-02 RX ORDER — IBUPROFEN 600 MG/1
600 TABLET, FILM COATED ORAL ONCE
Status: COMPLETED | OUTPATIENT
Start: 2021-08-02 | End: 2021-08-02

## 2021-08-02 RX ADMIN — IBUPROFEN 600 MG: 600 TABLET, FILM COATED ORAL at 17:00

## 2021-08-02 ASSESSMENT — ENCOUNTER SYMPTOMS
ABDOMINAL PAIN: 0
NECK PAIN: 1
BACK PAIN: 1

## 2021-08-02 NOTE — ED TRIAGE NOTES
Patient presents here for evaluation of pain and injury to his left knee. He reports that he fell a few days ago and injured his knee. He states that the joint is very painful and unstable when he bears weight on it. Additionally, he notes back pain following the fall.

## 2021-08-02 NOTE — PROGRESS NOTES
Clinic Care Coordination Contact  [facility]/Voicemail  Left Voicemail     Clinical Data: Care Coordinator Outreach  Outreach attempted x 2.  Left message on patient's voicemail with call back information and requested return call.    Chart Review: Referral from provider, hospital bill not covered at all by insurance. Gave number for billing department. Can you help me get patient in touch with patient advocate?    Plan:  Care Coordinator will try to reach patient again in 1-2 business days.    AMARA Sanchez, B.S. [facility] Care Coordination  [facility]:  [place], [place] and [place]  [phone]  [email or web address]    8/2/21:  CHW briefly spoke with patient and brother, brother states that pt is wanting to stay with PCP (Dr. Matthew) however he was booked out too far and needed to be seen right away.   Having trouble walking, knee, tore ACL? Neck and head feel swollen.     CHW sent message to triage RN to assess, CHW will call back tomorrow to address other CC needs and schedule assessment for enrollment.   PCP appt on 8/11

## 2021-08-02 NOTE — ED PROVIDER NOTES
EMERGENCY DEPARTMENT ENCOUNTER      NAME: Hamilton Mitchell  AGE: 25 year old male  YOB: 1995  MRN: [id number]  EVALUATION DATE & TIME: 2021  3:34 PM    PCP: [facility]    ED PROVIDER: Jonny Santos M.D.      Chief Complaint   Patient presents with     Knee Pain         FINAL IMPRESSION:  1.  Acute fall with acute left knee pain.      ED COURSE & MEDICAL DECISION MAKIN:00 PM I met with the patient to gather history and to perform my initial exam. We discussed plans for the ED course, including diagnostic testing and treatment. PPE worn: cloth mask  Pertinent Labs & Imaging studies reviewed. (See chart for details)  25 year old male presents to the Emergency Department for evaluation of acute left knee pain after falling to a kneeling position few days ago.  Additionally patient recently had a 5-day admission at another hospital elsewhere for catatonia and altered mental status with no etiology determined.  4:30 PM.  Patient refused MRI.  Reportedly an MRI -5 days ago.  Patient asking something for pain for his knee.  Ibuprofen was ordered.  6:10 PM.  Laboratory work unremarkable and white count of 11.6.  Chemistries unremarkable.  Knee x-ray unremarkable.  CRP and sed rate unremarkable.  Urinalysis positive for ketones but otherwise unremarkable.  Patient refused head MRI.  Results communicated to the patient and his family.  They are in agreement for the plan.  Knee immobilizer ordered.    At the conclusion of the encounter I discussed the results of all of the tests and the disposition. The questions were answered. The patient or family acknowledged understanding and was agreeable with the care plan.           MEDICATIONS GIVEN IN THE EMERGENCY:  Medications - No data to display    NEW PRESCRIPTIONS STARTED AT TODAY'S ER VISIT  New Prescriptions    No medications on file          =================================================================    HPI    Patient  information was obtained from: Patient, Brother    Use of : N/A         Hamilton Mitchell is a 25 year old male with a pertinent history of generalized muscle weakness who presents to this ED by walk in for evaluation of knee pain.    Patient reports developing pain in his left knee after he fell. Patient landed on his knee after falling in the grass. Pain is located on the anterior aspect of the knee. The patient's brother also reports the patient has been having neck and back pain. The patient was previously admitted for mental health at another hospital for 5 days and his brother was told he was in a catatonic state.  Reportedly negative work-up.  He denies abdominal pain. He does not identify any waxing or waning symptoms otherwise, exacerbating or alleviating features,associated symptoms except as mentioned.    REVIEW OF SYSTEMS   Review of Systems   Gastrointestinal: Negative for abdominal pain.   Musculoskeletal: Positive for back pain and neck pain.        Positive for knee pain (left).   All other systems reviewed and are negative.      PAST MEDICAL HISTORY:  History reviewed. No pertinent past medical history.    PAST SURGICAL HISTORY:  History reviewed. No pertinent surgical history.        CURRENT MEDICATIONS:    ALBUTEROL SULFATE HFA IN  ibuprofen (ADVIL/MOTRIN) 600 MG tablet        ALLERGIES:  No Known Allergies    FAMILY HISTORY:  Family History   Problem Relation Age of Onset     Colon Cancer Mother 45     Diabetes Mother      Hypertension Mother      Diabetes Maternal Grandfather        SOCIAL HISTORY:   Social History     Socioeconomic History     Marital status: Single     Spouse name: None     Number of children: None     Years of education: None     Highest education level: None   Occupational History     None   Tobacco Use     Smoking status: Former Smoker     Smokeless tobacco: Never Used   Substance and Sexual Activity     Alcohol use: Not Currently     Drug use: None     Sexual  activity: None   Other Topics Concern     None   Social History Narrative     None     Social Determinants of Health     Financial Resource Strain:      Difficulty of Paying Living Expenses:    Food Insecurity:      Worried About Running Out of Food in the Last Year:      Ran Out of Food in the Last Year:    Transportation Needs:      Lack of Transportation (Medical):      Lack of Transportation (Non-Medical):    Physical Activity:      Days of Exercise per Week:      Minutes of Exercise per Session:    Stress:      Feeling of Stress :    Social Connections:      Frequency of Communication with Friends and Family:      Frequency of Social Gatherings with Friends and Family:      Attends Zoroastrian Services:      Active Member of Clubs or Organizations:      Attends Club or Organization Meetings:      Marital Status:    Intimate Partner Violence:      Fear of Current or Ex-Partner:      Emotionally Abused:      Physically Abused:      Sexually Abused:        VITALS:  BP (!) 147/88   Pulse 101   Temp 99  F (37.2  C) (Oral)   Resp 16   Wt 102.1 kg (225 lb)   SpO2 98%   BMI 34.21 kg/m      PHYSICAL EXAM    Vital Signs:  BP (!) 147/88   Pulse 101   Temp 99  F (37.2  C) (Oral)   Resp 16   Wt 102.1 kg (225 lb)   SpO2 98%   BMI 34.21 kg/m    General:  On entering the room he is in no apparent distress.    Neck:  Neck supple with full range of motion and nontender.    Back:  Back and spine are nontender.  No costovertebral angle tenderness.    HEENT:  Oropharynx clear with moist mucous membranes.  HEENT unremarkable.    Pulmonary:  Chest clear to auscultation without rhonchi rales or wheezing.    Cardiovascular:  Cardiac regular rate and rhythm without murmurs rubs or gallops.    Abdomen:  Abdomen soft nontender.  There is no rebound or guarding.    Muskuloskeletal:  he moves all 4 without any difficulty and has normal neurovascular exams.  Extremities without clubbing, cyanosis, or edema.  Legs and calves are  nontender.  Full range of motion of the left knee with some mild swelling.  Mild tenderness anteriorly.  Otherwise negative with full range of motion, good pulse cap refill, and sensation.  Neuro:  he is alert and oriented ×3 and moves all extremities symmetrically.  Strength 5/5 in all 4 extremities.  Sensation intact in all 4 extremities.  Pupils reactive to light.  Extraocular metastatic.  Cranial nerves II through XII intact and equal bilaterally.  Patient seems slow of speech.  Psych:  Normal affect.    Skin:  Unremarkable and warm and dry.       LAB:  All pertinent labs reviewed and interpreted.  Labs Ordered and Resulted from Time of ED Arrival Up to the Time of Departure from the ED   CBC WITH PLATELETS - Abnormal; Notable for the following components:       Result Value    WBC Count 11.6 (*)     All other components within normal limits   BASIC METABOLIC PANEL - Abnormal; Notable for the following components:    Urea Nitrogen 4 (*)     All other components within normal limits   CRP INFLAMMATION - Abnormal; Notable for the following components:    CRP 1.4 (*)     All other components within normal limits   ROUTINE UA WITH MICROSCOPIC REFLEX TO CULTURE - Abnormal; Notable for the following components:    Ketones Urine Trace (*)     Bacteria Urine Few (*)     Mucus Urine Present (*)     All other components within normal limits    Narrative:     Urine Culture not indicated   ERYTHROCYTE SEDIMENTATION RATE AUTO - Normal   EXTRA RED TOP TUBE   EXTRA GREEN TOP (LITHIUM HEPARIN) TUBE   EXTRA PURPLE TOP TUBE   EXTRA TUBE    Narrative:     The following orders were created for panel order [facility].  Procedure                               Abnormality         Status                     ---------                               -----------         ------                     Extra Red Top Tube[[id number]]                               Final result               Extra Green Top (Lithium...[[id number]]                       Final result               Extra Purple Top Tube[[id number]]                            Final result                 Please view results for these tests on the individual orders.       RADIOLOGY:  Reviewed all pertinent imaging. Please see official radiology report.  XR Knee Left 3 Views   Final Result   IMPRESSION: Normal joint spaces and alignment. No fracture or joint effusion.                 EKG:      PROCEDURES:         I, Con Crow, am serving as a scribe to document services personally performed by Dr. Santos based on my observation and the provider's statements to me. IJonny MD attest that Con Crow is acting in a scribe capacity, has observed my performance of the services and has documented them in accordance with my direction.    Jonny Santos M.D.  Emergency Medicine  [facility] EMERGENCY DEPARTMENT  [facility] [facility] [address] [phone]  Dept: [phone]     Jonny Santos MD  08/02/21 2298

## 2021-08-02 NOTE — TELEPHONE ENCOUNTER
CHW contacted patient regarding CC referral, spoke to patients brother who had patient with next to him while on the phone.     Patient was seen at [facility] due needing to be seen ASAP and PCP booking too far out.    Brother states that patient has been having trouble walking, thinks maybe a torn ACL? Patient also complains of head and neck feeling swollen. They are contemplating being seen at Urgent Care or ED. Routing to triage nurse to call and assess patients symptoms.     Thank you!  AMARA Sanchez, B.S. [facility] Care Coordination  [facility]:  [place], [place] and [place]  [phone]  [email or web address]

## 2021-08-02 NOTE — TELEPHONE ENCOUNTER
Tana Odom MA  Cr Triage; Cr Sb2/3/4 Silver Team (Ma-Tc) 3 minutes ago (10:35 AM)   MB  Please call ASAP to review patients symptoms and direct if needs to be seen in ED or can schedule same day clinic appointment? Thank you!     Routing comment    Tana Odom MA 3 minutes ago (10:35 AM)   MB     CHW contacted patient regarding CC referral, spoke to patients brother who had patient with next to him while on the phone.      Patient was seen at [facility] due needing to be seen ASAP and PCP booking too far out.     Brother states that patient has been having trouble walking, thinks maybe a torn ACL? Patient also complains of head and neck feeling swollen. They are contemplating being seen at Urgent Care or ED. Routing to triage nurse to call and assess patients symptoms.      Thank you!  AMARA Sanchez, B.S. [facility] Care Coordination  [facility]:  [place], [place] and [place]  [phone]  [email or web address]

## 2021-08-02 NOTE — TELEPHONE ENCOUNTER
"S-(situation): Patient's brother called for concerns regarding injuries patient sustained.     B-(background): Patient states he was \"jumping around\" and he fell while outside at a concert.     A-(assessment): Patient states that he fell onto his knee during activity described above. Patient denies falling and hitting his head. Patient describes knee swelling and pain, rating 9/10, along with neck swelling according to brother.    R-(recommendations): Due to severity of symptoms described, protocol indicates that the patient needs to be seen in either the Emergency Department or an Urgent Care. Due to resources, Emergency Department is recommended rather than Urgent Care. Brother agreed to take patient to emergency department at [facility] in [place].     SARAN Banegas, RN  [facility]      Reason for Disposition    Swollen knee joint and fever    SEVERE pain (e.g. excruciating)    Knee injury is main concern    Sounds like a serious injury to the triager    SEVERE pain (e.g., excruciating)    Additional Information    Negative: Major bleeding (actively dripping or spurting) that can't be stopped    Negative: Bullet, stabbed by knife, or other serious penetrating wound    Negative: Looks like a dislocated joint (crooked or deformed)    Negative: Can't stand (bear weight) or walk    Negative: Serious injury with multiple fractures (broken bones)    Negative: Sounds like a life-threatening emergency to the triager    Negative: Major bleeding (actively dripping or spurting) that can't be stopped    Negative: Bullet, stabbed by knife or other serious penetrating wound    Negative: Looks like a dislocated joint (crooked or deformed)    Negative: Serious injury with multiple fractures (broken bones)    Negative: Sounds like a life-threatening emergency to the triager    Protocols used: KNEE PAIN-A-OH, LEG INJURY-A-OH, KNEE INJURY-A-OH    "

## 2021-08-02 NOTE — DISCHARGE INSTRUCTIONS
Knee immobilizer for the next 2 weeks for extra support.  See your clinic for follow-up in the next week.  If the knee persists, possible referral to orthopedics or MRI eventually.  Ibuprofen every 6 hours as needed for pain as prescribed.  If similar problems as your recent admission occur, your doctor can refer you to neurology for further evaluation.

## 2021-08-02 NOTE — LETTER
[facility] CARE COORDINATION  [facility]  August 5, 2021    [address]  [address] [place] [address]      Dear Hamilton,    I am a clinic community health worker who works with Dr. Matthew at the [facility]. I have been trying to reach you recently to introduce Clinic Care Coordination and to see if there was anything I could assist you with.  Below is a description of clinic care coordination and how I can further assist you.      The clinic care coordination team is made up of a registered nurse,  and community health worker who understand the health care system. The goal of clinic care coordination is to help you manage your health and improve access to the health care system in the most efficient manner. The team can assist you in meeting your health care goals by providing education, coordinating services, strengthening the communication among your providers and supporting you with any resource needs.    Please feel free to contact me at [phone] with any questions or concerns. We are focused on providing you with the highest-quality healthcare experience possible and that all starts with you.     Sincerely,     Tana Odom, AMARA, B.S. [facility]  Clinic Care Coordination  [facility]:  [place], [place] and [place]  [phone]  [email or web address]

## 2021-08-05 NOTE — PROGRESS NOTES
Clinic Care Coordination Contact  [facility]/Voicemail  Left Voicemail     Clinical Data: Care Coordinator Outreach  Outreach attempted x 3.  Left message on patient's voicemail with call back information and requested return call.    Chart Review: Referral from provider, hospital bill not covered at all by insurance. Gave number for billing department. Can you help me get patient in touch with patient advocate?    Plan: Care Coordinator will send care coordination introduction letter with care coordinator contact information and explanation of care coordination services via [employer]. Care Coordinator will do no further outreaches at this time.    Tana Odom, AMARA, B.S. [facility] Care Coordination  [facility]:  [place], [place] and [place]  [phone]  [email or web address]

## 2021-08-11 ENCOUNTER — OFFICE VISIT (OUTPATIENT)
Dept: FAMILY MEDICINE | Facility: CLINIC | Age: 26
End: 2021-08-11
Payer: COMMERCIAL

## 2021-08-11 VITALS
SYSTOLIC BLOOD PRESSURE: 138 MMHG | RESPIRATION RATE: 12 BRPM | DIASTOLIC BLOOD PRESSURE: 80 MMHG | BODY MASS INDEX: 33.91 KG/M2 | WEIGHT: 223 LBS | TEMPERATURE: 98.4 F | OXYGEN SATURATION: 100 % | HEART RATE: 98 BPM

## 2021-08-11 DIAGNOSIS — F06.1 CATATONIA: ICD-10-CM

## 2021-08-11 DIAGNOSIS — S80.02XD CONTUSION OF LEFT KNEE, SUBSEQUENT ENCOUNTER: ICD-10-CM

## 2021-08-11 DIAGNOSIS — R17 ELEVATED BILIRUBIN: Primary | ICD-10-CM

## 2021-08-11 DIAGNOSIS — B07.0 PLANTAR WART: ICD-10-CM

## 2021-08-11 PROCEDURE — 80076 HEPATIC FUNCTION PANEL: CPT | Performed by: FAMILY MEDICINE

## 2021-08-11 PROCEDURE — 99213 OFFICE O/P EST LOW 20 MIN: CPT | Mod: 25 | Performed by: FAMILY MEDICINE

## 2021-08-11 PROCEDURE — 17110 DESTRUCTION B9 LES UP TO 14: CPT | Performed by: FAMILY MEDICINE

## 2021-08-11 PROCEDURE — 36415 COLL VENOUS BLD VENIPUNCTURE: CPT | Performed by: FAMILY MEDICINE

## 2021-08-11 NOTE — PROGRESS NOTES
"    Assessment & Plan     Catatonia  Resolved, but pt is still having residual somatic symptoms, like numbness in the arms, feeling fatigue, neck and back pain, diffculty swallowing, neck swelling and face swelling.   Will refer to psychiatry for further evaluation, encourage good sleep hygiene as sleep is a major issue for patient, pt does not wish to start on any medications.   - MENTAL HEALTH REFERRAL  - Adult; Psychiatry; Psychiatry; Collaborative Care Psychiatry Service/Bridge to Long-Term Psychiatry as indicated ([phone]); Yes; Diagnostic clarification; Yes; We will contact you to schedule the appointment or ple...; Future    Elevated bilirubin  Repeat LFT. 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   - Hepatic panel (Albumin, ALT, AST, Bili, Alk Phos, TP); Future  - Hepatic panel (Albumin, ALT, AST, Bili, Alk Phos, TP)    Contusion of left knee, subsequent encounter  Exam is reassuring, watchful waiting, I recommend, resting the affected joint, elevation, use Ice on the joint for 15 minutes 4 times a day, and may use OTC medicine for pain management.  Recheck in 1 month.    Wart: noticed on the left foot during exam, pt had it for  Many months.   The warts were treated with liquid nitrogen for 20 to 40 seconds each, explained the risks of the procedure to the patient/parent, agreed with the procedure, lesion may form a blister, or crust over, there is possibility of hyper/hypo pigmentation after resolution.  Follow up in 3 weeks if the lesion did not go away.                 BMI:   Estimated body mass index is 33.91 kg/m  as calculated from the following:    Height as of 7/30/21: 1.727 m (5' 8\").    Weight as of this encounter: 101.2 kg (223 lb).   Weight management plan: Discussed healthy diet and exercise guidelines        Return in about 4 weeks (around 9/8/2021).    Grupo Matthew MD  [facility] NOEMÍ Villasenor is a 25 year old who presents for the following health issues "     HPI     ED/UC Followup:    Facility:  [facility]  Date of visit: 8/2/2021  Reason for visit: contusion of L knee  Current Status: some improvement   Pt was walking and fell on the knee into the grass, he went to ER, had Xray done, and was diagnosed with contusion of the knee.  Since discharge from the hospital fro his catatonic state, he has been doing better, but he is not 100%, pt went on a vacation on 7/22, and he was feeling rough as it is hard to sleep, then when he came back, he felt as the same as before he went to the hospital like numbnes in the hands, trouble swallowing, and swelling glands.  Pt complains of numbness in the arms along with neck pain and back pain.  [place] working there as he does not feel he is back to normal yet.        Review of Systems   CONSTITUTIONAL: NEGATIVE for fever, chills, change in weight  ENT/MOUTH: occasionally he feels difficulty swelling in the neck area, along with swelling of the lymph nodes and face according to patient.      Objective    There were no vitals taken for this visit.  There is no height or weight on file to calculate BMI.  Physical Exam   GENERAL: healthy, alert and no distress  HENT: ear canals and TM's normal, nose and mouth without ulcers or lesions  NECK: no adenopathy, no asymmetry, masses, or scars and thyroid normal to palpation  RESP: lungs clear to auscultation - no rales, rhonchi or wheezes  CV: regular rate and rhythm, normal S1 S2, no S3 or S4, no murmur, click or rub, no peripheral edema and peripheral pulses strong  ABDOMEN: soft, nontender, no hepatosplenomegaly, no masses and bowel sounds normal  Knee exam:  Inspection:nl   no antalgic gait,no soft tissue tenderness over knee, effusion negative,  range of motion of the knee is painful, - anterior and posterior drawer sign, - pivot-shift, collateral ligaments intact, - Kamla sign, - Apley's sign, - Lachmann sign, no tenderness  over tibial tubercle.  X-ray done in the ER was normal.    Skin: wart, large about 1.5 cm on the left anterior foot.

## 2021-08-12 LAB
ALBUMIN SERPL-MCNC: 3.7 G/DL (ref 3.4–5)
ALP SERPL-CCNC: 71 U/L (ref 40–150)
ALT SERPL W P-5'-P-CCNC: 24 U/L (ref 0–70)
AST SERPL W P-5'-P-CCNC: 18 U/L (ref 0–45)
BILIRUB DIRECT SERPL-MCNC: 0.1 MG/DL (ref 0–0.2)
BILIRUB SERPL-MCNC: 0.8 MG/DL (ref 0.2–1.3)
PROT SERPL-MCNC: 7.6 G/DL (ref 6.8–8.8)

## 2021-08-22 ENCOUNTER — HEALTH MAINTENANCE LETTER (OUTPATIENT)
Age: 26
End: 2021-08-22

## 2021-09-14 ENCOUNTER — OFFICE VISIT (OUTPATIENT)
Dept: FAMILY MEDICINE | Facility: CLINIC | Age: 26
End: 2021-09-14
Payer: COMMERCIAL

## 2021-09-14 VITALS
OXYGEN SATURATION: 100 % | HEIGHT: 68 IN | HEART RATE: 92 BPM | BODY MASS INDEX: 33.28 KG/M2 | WEIGHT: 219.6 LBS | SYSTOLIC BLOOD PRESSURE: 124 MMHG | TEMPERATURE: 98 F | DIASTOLIC BLOOD PRESSURE: 80 MMHG

## 2021-09-14 DIAGNOSIS — Z11.59 NEED FOR HEPATITIS C SCREENING TEST: ICD-10-CM

## 2021-09-14 DIAGNOSIS — B07.0 PLANTAR WARTS: ICD-10-CM

## 2021-09-14 DIAGNOSIS — Z11.4 SCREENING FOR HIV (HUMAN IMMUNODEFICIENCY VIRUS): ICD-10-CM

## 2021-09-14 DIAGNOSIS — R53.83 FATIGUE, UNSPECIFIED TYPE: Primary | ICD-10-CM

## 2021-09-14 PROCEDURE — 17110 DESTRUCTION B9 LES UP TO 14: CPT | Performed by: FAMILY MEDICINE

## 2021-09-14 PROCEDURE — 99213 OFFICE O/P EST LOW 20 MIN: CPT | Mod: 25 | Performed by: FAMILY MEDICINE

## 2021-09-14 ASSESSMENT — MIFFLIN-ST. JEOR: SCORE: 1945.6

## 2021-09-14 NOTE — PROGRESS NOTES
Assessment & Plan     Screening for HIV (human immunodeficiency virus)      Need for hepatitis C screening test      Fatigue, unspecified type  Hx of recurrent episodes in the last few weeks, but he is feeling better and symptoms has resolved.  Pt is struggling to find a new job, also struggling financially with his recent medical bills, will refer to   - Care Coordination Referral; Future    Plantar warts  The warts were treated with liquid nitrogen for 20 to 40 seconds each, explained the risks of the procedure to the patient/parent, agreed with the procedure, lesion may form a blister, or crust over, there is possibility of hyper/hypo pigmentation after resolution.  Follow up in 3 weeks if the lesion did not go away.    - salicylic acid (COMPOUND W MAX STRENGTH) 17 % external gel; Apply topically daily                 No follow-ups on file.    Grupo Matthew MD  [facility]    Isauro Villasenor is a 25 year old who presents for the following health issues     HPI         How many servings of fruits and vegetables do you eat daily?  2-3    On average, how many sweetened beverages do you drink each day (Examples: soda, juice, sweet tea, etc.  Do NOT count diet or artificially sweetened beverages)?   2    How many days per week do you exercise enough to make your heart beat faster? 3 or less    How many minutes a day do you exercise enough to make your heart beat faster? 9 or less    How many days per week do you miss taking your medication? 0    Follow up with corn/wart removal .  Since last visit he improved slightly but it is still bothering him.      Future Appointments   Date Time Provider Department Center   9/14/2021  2:50 PM Grupo Matthew MD [facility]     Appointment Notes for this encounter:   Body aches and after emergency room check    Health Maintenance Due   Topic Date Due     PREVENTIVE CARE VISIT  Never done     ADVANCE CARE PLANNING  Never done     COVID-19 Vaccine (1) Never  "done     HIV SCREENING  Never done     HEPATITIS C SCREENING  Never done     DTAP/TDAP/TD IMMUNIZATION (7 - Td or Tdap) 05/05/2016     INFLUENZA VACCINE (1) 09/01/2021     Health Maintenance addressed:  Flu Vaccine, PHQ9, GAD7 and Covid-19    PHQ9 / BATSHEVA / ACT Gave pt questionnaire to complete and Immunizations Pt declined    MyChart Status:  Active and Using      Review of Systems   Had another episode when he felt sore throat, neck pain, headache, fatigue and body aches then it went away in 1 to 2 weeks.       Objective    BP (!) 141/91 (BP Location: Right arm, Patient Position: Sitting, Cuff Size: Adult Regular)   Pulse 92   Temp 98  F (36.7  C) (Oral)   Ht 1.727 m (5' 8\")   Wt 99.6 kg (219 lb 9.6 oz)   SpO2 100%   BMI 33.39 kg/m    Body mass index is 33.39 kg/m .  Physical Exam   GENERAL: healthy, alert and no distress  HENT: nose and mouth without ulcers or lesions, oropharynx clear and oral mucous membranes moist  SKIN: there is a large 2 cm rough wart on the base of the 2nd and 3rd toe on the left foot.                "

## 2021-09-15 ENCOUNTER — PATIENT OUTREACH (OUTPATIENT)
Dept: CARE COORDINATION | Facility: CLINIC | Age: 26
End: 2021-09-15

## 2021-09-15 NOTE — PROGRESS NOTES
Clinic Care Coordination Contact  [facility]/Voicemail       Clinical Data: Care Coordinator Outreach  Outreach attempted x 1.  Left message on patient's voicemail with call back information and requested return call.    Chart Review: Referral from PCP, financial support- insurance.     Plan: Care Coordinator will try to reach patient again in 1-2 business days.    AMARA Sanchez, B.S. [facility] Care Coordination  [facility] Clinics:  [place], [place] and [place]  [phone]  [email or web address]

## 2021-09-16 ENCOUNTER — PATIENT OUTREACH (OUTPATIENT)
Dept: CARE COORDINATION | Facility: CLINIC | Age: 26
End: 2021-09-16

## 2021-09-16 NOTE — PROGRESS NOTES
Clinic Care Coordination Contact  [facility]/Voicemail       Clinical Data: Care Coordinator Outreach  Outreach attempted x 2.  Left message on patient's voicemail with call back information and requested return call.    Chart Review: Referral from PCP, financial support- insurance.     Plan: Care Coordinator will send care coordination introduction letter with care coordinator contact information and explanation of care coordination services   via [employer]. Care Coordinator will do no further outreaches at this time.    Zaina Rodriguez  [facility] Care Coordination  [facility], [facility], and [facility]    Phone: [phone]

## 2021-09-16 NOTE — LETTER
[facility] CARE COORDINATION  [address]  [place] [address]    September 16, 2021    Hamilton FABIAN Stephen  [address] [place] [address]      Dear Hamilton,    I am a clinic community health worker who works with Grupo Crawford MD and [facility]. I have been trying to reach you recently to introduce Clinic Care Coordination and to see if there was anything I could assist you with.  Below is a description of clinic care coordination and how I can further assist you.      The clinic care coordination team is made up of a registered nurse,  and community health worker who understand the health care system. The goal of clinic care coordination is to help you manage your health and improve access to the health care system in the most efficient manner. The team can assist you in meeting your health care goals by providing education, coordinating services, strengthening the communication among your providers and supporting you with any resource needs.    Please feel free to contact the Community Health Worker at [phone] with any questions or concerns. We are focused on providing you with the highest-quality healthcare experience possible and that all starts with you.     Sincerely,     Zaina Rodriguez  Clinic Care Coordination  [facility], [facility], and [facility]    Phone: [phone]

## 2021-10-17 ENCOUNTER — HEALTH MAINTENANCE LETTER (OUTPATIENT)
Age: 26
End: 2021-10-17

## 2022-10-03 ENCOUNTER — HEALTH MAINTENANCE LETTER (OUTPATIENT)
Age: 27
End: 2022-10-03

## 2023-10-21 ENCOUNTER — HEALTH MAINTENANCE LETTER (OUTPATIENT)
Age: 28
End: 2023-10-21